# Patient Record
Sex: FEMALE | Race: WHITE | Employment: UNEMPLOYED | URBAN - NONMETROPOLITAN AREA
[De-identification: names, ages, dates, MRNs, and addresses within clinical notes are randomized per-mention and may not be internally consistent; named-entity substitution may affect disease eponyms.]

---

## 2019-06-06 LAB
IGA: 122
IGG: 545
IGM: 121

## 2019-07-10 ENCOUNTER — OFFICE VISIT (OUTPATIENT)
Dept: PRIMARY CARE CLINIC | Age: 54
End: 2019-07-10
Payer: COMMERCIAL

## 2019-07-10 VITALS
WEIGHT: 125 LBS | OXYGEN SATURATION: 98 % | SYSTOLIC BLOOD PRESSURE: 120 MMHG | HEART RATE: 88 BPM | HEIGHT: 65 IN | DIASTOLIC BLOOD PRESSURE: 70 MMHG | TEMPERATURE: 98 F | RESPIRATION RATE: 16 BRPM | BODY MASS INDEX: 20.83 KG/M2

## 2019-07-10 DIAGNOSIS — K21.9 GASTROESOPHAGEAL REFLUX DISEASE WITHOUT ESOPHAGITIS: ICD-10-CM

## 2019-07-10 DIAGNOSIS — R53.82 CHRONIC FATIGUE: ICD-10-CM

## 2019-07-10 DIAGNOSIS — D50.9 IRON DEFICIENCY ANEMIA, UNSPECIFIED IRON DEFICIENCY ANEMIA TYPE: Primary | ICD-10-CM

## 2019-07-10 DIAGNOSIS — D70.9 NEUTROPENIA, UNSPECIFIED TYPE (HCC): ICD-10-CM

## 2019-07-10 DIAGNOSIS — D84.9 IMMUNODEFICIENCY (HCC): ICD-10-CM

## 2019-07-10 PROBLEM — K25.9 MULTIPLE GASTRIC ULCERS: Status: ACTIVE | Noted: 2019-07-10

## 2019-07-10 PROBLEM — M17.10 KNEE ARTHROPATHY: Status: ACTIVE | Noted: 2019-07-10

## 2019-07-10 PROBLEM — N20.0 KIDNEY STONES: Status: ACTIVE | Noted: 2019-07-10

## 2019-07-10 PROBLEM — G43.009 MIGRAINE WITHOUT AURA AND WITHOUT STATUS MIGRAINOSUS, NOT INTRACTABLE: Status: ACTIVE | Noted: 2019-07-10

## 2019-07-10 PROBLEM — K83.1 BILE DUCT STENOSIS: Status: ACTIVE | Noted: 2019-07-10

## 2019-07-10 PROCEDURE — 99213 OFFICE O/P EST LOW 20 MIN: CPT | Performed by: INTERNAL MEDICINE

## 2019-07-10 SDOH — HEALTH STABILITY: MENTAL HEALTH: HOW OFTEN DO YOU HAVE A DRINK CONTAINING ALCOHOL?: NEVER

## 2019-07-10 ASSESSMENT — PATIENT HEALTH QUESTIONNAIRE - PHQ9
SUM OF ALL RESPONSES TO PHQ QUESTIONS 1-9: 0
2. FEELING DOWN, DEPRESSED OR HOPELESS: 0
1. LITTLE INTEREST OR PLEASURE IN DOING THINGS: 0
SUM OF ALL RESPONSES TO PHQ QUESTIONS 1-9: 0
SUM OF ALL RESPONSES TO PHQ9 QUESTIONS 1 & 2: 0

## 2019-07-10 ASSESSMENT — ENCOUNTER SYMPTOMS
EYE ITCHING: 0
EYE DISCHARGE: 0
TROUBLE SWALLOWING: 0
VOMITING: 0
EYE PAIN: 0
COLOR CHANGE: 0
SORE THROAT: 0
CONSTIPATION: 0
RHINORRHEA: 0
DIARRHEA: 0
ABDOMINAL PAIN: 1
BLOOD IN STOOL: 0
PHOTOPHOBIA: 0
FACIAL SWELLING: 0
COUGH: 1
ANAL BLEEDING: 0
NAUSEA: 0
WHEEZING: 0
SHORTNESS OF BREATH: 0
STRIDOR: 0

## 2019-07-10 NOTE — ASSESSMENT & PLAN NOTE
Request was made in past for immunoglobulin G but was denied by her insurance company.   We will see Dr. Davison  is willing to request this again

## 2019-07-10 NOTE — PROGRESS NOTES
7/10/2019    Name: Tylor Lopez : 1965 Sex: female  Age: 47 y.o. Subjective:  Chief Complaint   Patient presents with    GI Problem     3 month follow up      Patient has a history of iron deficiency anemia secondary to possible GI blood loss. Has not not had an EGD recently. Previous EGD showed multiple gastric ulcers and GERD. She also has bile duct stenosis and previously had a stent. Has not had an evaluation for  bile duct stenosis in about 4 years. She sees Dr. Sammy Dolan who has her on IV infusions with iron. She has brought her hemoglobin up to about 10 which is the highest has been in 15 years. She still extremely tired. She also has neutropenia which is being followed by hematology. Has a history of kidney stones, knee arthritis, migraines and immune deficiency. Review of Systems   Constitutional: Negative for appetite change, fatigue and unexpected weight change. HENT: Negative for congestion, ear pain, facial swelling, rhinorrhea, sore throat, tinnitus and trouble swallowing. Eyes: Negative for photophobia, pain, discharge, itching and visual disturbance. Respiratory: Positive for cough. Negative for shortness of breath, wheezing and stridor. Cardiovascular: Negative for chest pain, palpitations and leg swelling. Gastrointestinal: Positive for abdominal pain. Negative for anal bleeding, blood in stool, constipation, diarrhea, nausea and vomiting. Endocrine: Negative for cold intolerance, heat intolerance, polydipsia, polyphagia and polyuria. Genitourinary: Negative for difficulty urinating, dysuria, flank pain, frequency, hematuria and urgency. Musculoskeletal: Positive for arthralgias. Negative for gait problem, joint swelling and myalgias. Skin: Negative for color change, pallor and rash. Allergic/Immunologic: Negative for environmental allergies and food allergies.    Neurological: Negative for dizziness, tremors, seizures, syncope, speech difficulty,

## 2019-07-24 ENCOUNTER — OFFICE VISIT (OUTPATIENT)
Dept: PRIMARY CARE CLINIC | Age: 54
End: 2019-07-24
Payer: COMMERCIAL

## 2019-07-24 VITALS
DIASTOLIC BLOOD PRESSURE: 90 MMHG | HEIGHT: 65 IN | WEIGHT: 126.2 LBS | HEART RATE: 102 BPM | RESPIRATION RATE: 18 BRPM | SYSTOLIC BLOOD PRESSURE: 140 MMHG | BODY MASS INDEX: 21.02 KG/M2 | OXYGEN SATURATION: 98 % | TEMPERATURE: 98 F

## 2019-07-24 DIAGNOSIS — R05.9 COUGH: ICD-10-CM

## 2019-07-24 DIAGNOSIS — J40 BRONCHITIS: Primary | ICD-10-CM

## 2019-07-24 PROCEDURE — 99213 OFFICE O/P EST LOW 20 MIN: CPT | Performed by: INTERNAL MEDICINE

## 2019-07-24 RX ORDER — FERROUS SULFATE 7.5 MG/0.5
15 SYRINGE (EA) ORAL DAILY
COMMUNITY
End: 2022-07-20 | Stop reason: ALTCHOICE

## 2019-07-24 RX ORDER — BENZONATATE 100 MG/1
100 CAPSULE ORAL 2 TIMES DAILY PRN
Qty: 20 CAPSULE | Refills: 0 | Status: SHIPPED | OUTPATIENT
Start: 2019-07-24 | End: 2019-07-31

## 2019-07-24 RX ORDER — CEFDINIR 300 MG/1
300 CAPSULE ORAL 2 TIMES DAILY
Qty: 14 CAPSULE | Refills: 0 | Status: SHIPPED | OUTPATIENT
Start: 2019-07-24 | End: 2019-07-31

## 2019-07-24 ASSESSMENT — ENCOUNTER SYMPTOMS
WHEEZING: 1
SORE THROAT: 1
COUGH: 1
SHORTNESS OF BREATH: 1
CHEST TIGHTNESS: 0

## 2019-07-24 NOTE — PROGRESS NOTES
140/90   Site: Left Upper Arm   Position: Sitting   Cuff Size: Medium Adult   Pulse: 102   Resp: 18   Temp: 98 °F (36.7 °C)   TempSrc: Temporal   SpO2: 98%   Weight: 126 lb 3.2 oz (57.2 kg)   Height: 5' 5\" (1.651 m)       Physical Exam   Constitutional: No distress. HENT:   Head: Normocephalic and atraumatic. Right Ear: External ear normal.   Left Ear: External ear normal.   Mouth/Throat: Oropharynx is clear and moist. No oropharyngeal exudate. Eyes: EOM are normal.   Neck: Neck supple. Cardiovascular: Normal rate, regular rhythm and normal heart sounds. Pulmonary/Chest: Effort normal. She has no wheezes. Bilateral ronchi   Lymphadenopathy:     She has no cervical adenopathy.         Problem List        Respiratory    Bronchitis - Primary     cxr  Cefdinir 300 mg bid 7 days         Relevant Medications    cefdinir (OMNICEF) 300 MG capsule    Other Relevant Orders    XR CHEST STANDARD (2 VW)       Other    Cough     Benzonatate prn cough         Relevant Medications    benzonatate (TESSALON) 100 MG capsule    Other Relevant Orders    XR CHEST STANDARD (2 VW)      See me on Monday, 7/29/2019 if no better    Seen by:   Ludwig Wolff,

## 2019-07-29 DIAGNOSIS — R05.9 COUGH: ICD-10-CM

## 2019-07-29 DIAGNOSIS — J40 BRONCHITIS: ICD-10-CM

## 2019-08-06 DIAGNOSIS — J40 BRONCHITIS: Primary | ICD-10-CM

## 2019-08-06 DIAGNOSIS — R05.9 COUGH: ICD-10-CM

## 2019-08-06 RX ORDER — GUAIFENESIN AND CODEINE PHOSPHATE 100; 10 MG/5ML; MG/5ML
5 SOLUTION ORAL 2 TIMES DAILY PRN
Qty: 30 ML | Refills: 1 | Status: SHIPPED | OUTPATIENT
Start: 2019-08-06 | End: 2019-08-09

## 2019-08-06 RX ORDER — CEFDINIR 300 MG/1
300 CAPSULE ORAL 2 TIMES DAILY
Qty: 14 CAPSULE | Refills: 0 | Status: SHIPPED | OUTPATIENT
Start: 2019-08-06 | End: 2019-08-13

## 2019-08-23 PROBLEM — R05.9 COUGH: Status: RESOLVED | Noted: 2019-07-24 | Resolved: 2019-08-23

## 2019-10-09 ENCOUNTER — OFFICE VISIT (OUTPATIENT)
Dept: PRIMARY CARE CLINIC | Age: 54
End: 2019-10-09
Payer: COMMERCIAL

## 2019-10-09 VITALS
BODY MASS INDEX: 20.83 KG/M2 | TEMPERATURE: 98.6 F | RESPIRATION RATE: 16 BRPM | HEART RATE: 86 BPM | OXYGEN SATURATION: 99 % | WEIGHT: 125 LBS | HEIGHT: 65 IN | SYSTOLIC BLOOD PRESSURE: 128 MMHG | DIASTOLIC BLOOD PRESSURE: 78 MMHG

## 2019-10-09 DIAGNOSIS — D50.0 IRON DEFICIENCY ANEMIA DUE TO CHRONIC BLOOD LOSS: ICD-10-CM

## 2019-10-09 DIAGNOSIS — K21.9 GASTROESOPHAGEAL REFLUX DISEASE WITHOUT ESOPHAGITIS: ICD-10-CM

## 2019-10-09 DIAGNOSIS — R53.82 CHRONIC FATIGUE: ICD-10-CM

## 2019-10-09 DIAGNOSIS — D70.9 NEUTROPENIA, UNSPECIFIED TYPE (HCC): ICD-10-CM

## 2019-10-09 DIAGNOSIS — M17.10 KNEE ARTHROPATHY: Primary | ICD-10-CM

## 2019-10-09 PROBLEM — R05.9 COUGH: Status: ACTIVE | Noted: 2017-02-02

## 2019-10-09 PROBLEM — N20.1 OCCLUSION OF URETER DUE TO CALCULUS: Status: ACTIVE | Noted: 2017-05-23

## 2019-10-09 PROCEDURE — 3014F SCREEN MAMMO DOC REV: CPT | Performed by: INTERNAL MEDICINE

## 2019-10-09 PROCEDURE — 99213 OFFICE O/P EST LOW 20 MIN: CPT | Performed by: INTERNAL MEDICINE

## 2019-10-09 PROCEDURE — G8420 CALC BMI NORM PARAMETERS: HCPCS | Performed by: INTERNAL MEDICINE

## 2019-10-09 PROCEDURE — 3017F COLORECTAL CA SCREEN DOC REV: CPT | Performed by: INTERNAL MEDICINE

## 2019-10-09 PROCEDURE — 1036F TOBACCO NON-USER: CPT | Performed by: INTERNAL MEDICINE

## 2019-10-09 PROCEDURE — G8427 DOCREV CUR MEDS BY ELIG CLIN: HCPCS | Performed by: INTERNAL MEDICINE

## 2019-10-09 PROCEDURE — G8484 FLU IMMUNIZE NO ADMIN: HCPCS | Performed by: INTERNAL MEDICINE

## 2019-10-09 RX ORDER — CALCIUM CARBONATE 200(500)MG
1 TABLET,CHEWABLE ORAL 3 TIMES DAILY PRN
COMMUNITY
End: 2020-04-06

## 2019-10-09 ASSESSMENT — ENCOUNTER SYMPTOMS
WHEEZING: 0
COUGH: 1
DIARRHEA: 0
EYE PAIN: 0
SORE THROAT: 0
SHORTNESS OF BREATH: 0
COLOR CHANGE: 0
PHOTOPHOBIA: 0
BLOOD IN STOOL: 0
NAUSEA: 0
TROUBLE SWALLOWING: 0
EYE ITCHING: 0
VOMITING: 0
ANAL BLEEDING: 0
FACIAL SWELLING: 0
CHEST TIGHTNESS: 0
CONSTIPATION: 0
RHINORRHEA: 0
STRIDOR: 0
EYE DISCHARGE: 0
ABDOMINAL PAIN: 0

## 2019-11-04 ENCOUNTER — TELEPHONE (OUTPATIENT)
Dept: PRIMARY CARE CLINIC | Age: 54
End: 2019-11-04

## 2019-11-08 PROBLEM — R05.9 COUGH: Status: RESOLVED | Noted: 2017-02-02 | Resolved: 2019-11-08

## 2019-12-12 DIAGNOSIS — Z12.31 ENCOUNTER FOR SCREENING MAMMOGRAM FOR MALIGNANT NEOPLASM OF BREAST: Primary | ICD-10-CM

## 2020-01-29 ENCOUNTER — OFFICE VISIT (OUTPATIENT)
Dept: PRIMARY CARE CLINIC | Age: 55
End: 2020-01-29
Payer: COMMERCIAL

## 2020-01-29 VITALS
TEMPERATURE: 98 F | WEIGHT: 124 LBS | BODY MASS INDEX: 20.66 KG/M2 | SYSTOLIC BLOOD PRESSURE: 138 MMHG | HEIGHT: 65 IN | DIASTOLIC BLOOD PRESSURE: 78 MMHG | HEART RATE: 99 BPM | OXYGEN SATURATION: 99 %

## 2020-01-29 PROCEDURE — 1036F TOBACCO NON-USER: CPT | Performed by: INTERNAL MEDICINE

## 2020-01-29 PROCEDURE — 3017F COLORECTAL CA SCREEN DOC REV: CPT | Performed by: INTERNAL MEDICINE

## 2020-01-29 PROCEDURE — G8427 DOCREV CUR MEDS BY ELIG CLIN: HCPCS | Performed by: INTERNAL MEDICINE

## 2020-01-29 PROCEDURE — G9899 SCRN MAM PERF RSLTS DOC: HCPCS | Performed by: INTERNAL MEDICINE

## 2020-01-29 PROCEDURE — G8484 FLU IMMUNIZE NO ADMIN: HCPCS | Performed by: INTERNAL MEDICINE

## 2020-01-29 PROCEDURE — G8420 CALC BMI NORM PARAMETERS: HCPCS | Performed by: INTERNAL MEDICINE

## 2020-01-29 PROCEDURE — 99213 OFFICE O/P EST LOW 20 MIN: CPT | Performed by: INTERNAL MEDICINE

## 2020-01-29 RX ORDER — ESOMEPRAZOLE MAGNESIUM 20 MG
20 CAPSULE,DELAYED RELEASE (ENTERIC COATED) ORAL
Qty: 30 CAPSULE | Refills: 3 | Status: SHIPPED
Start: 2020-01-29 | End: 2020-04-06

## 2020-01-29 ASSESSMENT — ENCOUNTER SYMPTOMS
DIARRHEA: 0
CHEST TIGHTNESS: 0
ABDOMINAL PAIN: 1
ANAL BLEEDING: 0
BLOOD IN STOOL: 0
CONSTIPATION: 0
COLOR CHANGE: 0
EYE ITCHING: 0
SORE THROAT: 0
VOMITING: 0
NAUSEA: 0
EYE DISCHARGE: 0
EYE PAIN: 0
SHORTNESS OF BREATH: 0
STRIDOR: 0
PHOTOPHOBIA: 0
RHINORRHEA: 0
WHEEZING: 0
COUGH: 0
TROUBLE SWALLOWING: 0
FACIAL SWELLING: 0

## 2020-01-29 ASSESSMENT — PATIENT HEALTH QUESTIONNAIRE - PHQ9
2. FEELING DOWN, DEPRESSED OR HOPELESS: 0
SUM OF ALL RESPONSES TO PHQ QUESTIONS 1-9: 0
SUM OF ALL RESPONSES TO PHQ QUESTIONS 1-9: 0
SUM OF ALL RESPONSES TO PHQ9 QUESTIONS 1 & 2: 0
1. LITTLE INTEREST OR PLEASURE IN DOING THINGS: 0

## 2020-01-29 NOTE — ASSESSMENT & PLAN NOTE
Continue her Nexium.   Hopefully she will get well enough that she can undergo an EGD with Dr. Nathan Gamble

## 2020-01-30 ENCOUNTER — TELEPHONE (OUTPATIENT)
Dept: PRIMARY CARE CLINIC | Age: 55
End: 2020-01-30

## 2020-01-30 RX ORDER — PANTOPRAZOLE SODIUM 40 MG/1
40 TABLET, DELAYED RELEASE ORAL
Qty: 30 TABLET | Refills: 5 | Status: SHIPPED
Start: 2020-01-30 | End: 2021-10-18 | Stop reason: SDUPTHER

## 2020-04-03 ENCOUNTER — TELEPHONE (OUTPATIENT)
Dept: PRIMARY CARE CLINIC | Age: 55
End: 2020-04-03

## 2020-04-03 RX ORDER — CEFDINIR 300 MG/1
300 CAPSULE ORAL 2 TIMES DAILY
Qty: 20 CAPSULE | Refills: 0 | Status: SHIPPED
Start: 2020-04-03 | End: 2020-04-13 | Stop reason: ALTCHOICE

## 2020-04-06 ENCOUNTER — VIRTUAL VISIT (OUTPATIENT)
Dept: PRIMARY CARE CLINIC | Age: 55
End: 2020-04-06
Payer: COMMERCIAL

## 2020-04-06 PROBLEM — R77.1 HYPOGLOBULINEMIA: Status: ACTIVE | Noted: 2020-04-06

## 2020-04-06 PROBLEM — J01.40 SUBACUTE PANSINUSITIS: Status: ACTIVE | Noted: 2020-04-06

## 2020-04-06 PROCEDURE — 99442 PR PHYS/QHP TELEPHONE EVALUATION 11-20 MIN: CPT | Performed by: INTERNAL MEDICINE

## 2020-04-06 ASSESSMENT — ENCOUNTER SYMPTOMS
VOMITING: 0
DIARRHEA: 0
ANAL BLEEDING: 0
SINUS PAIN: 1
SORE THROAT: 0
EYE ITCHING: 0
WHEEZING: 0
RHINORRHEA: 0
COLOR CHANGE: 0
NAUSEA: 1
SINUS PRESSURE: 1
CHEST TIGHTNESS: 0
TROUBLE SWALLOWING: 0
PHOTOPHOBIA: 0
EYE PAIN: 0
BLOOD IN STOOL: 0
EYE DISCHARGE: 0
COUGH: 1
FACIAL SWELLING: 0
ABDOMINAL PAIN: 1
CONSTIPATION: 0
STRIDOR: 0

## 2020-04-06 NOTE — PROGRESS NOTES
List        Respiratory    Bronchitis - Primary     Continue Ceftdinir. I am reluctant to send her for imaging studies at this time in view of the Covid 19 situation and her immunodeficient state. Relevant Medications    cefdinir (OMNICEF) 300 MG capsule    Acute pansinusitis     Continue antihistamines and antibiotics. Relevant Medications    cefdinir (OMNICEF) 300 MG capsule       Digestive    Gastroesophageal reflux disease without esophagitis     Continue pantoprazole and see Dr. Hilario Layne in the future         Relevant Medications    pantoprazole (PROTONIX) 40 MG tablet    Bile duct stenosis     Continue IV Zofran as needed for nausea         Multiple gastric ulcers     Unable to get EGD because of Covid 19 situation         Relevant Medications    pantoprazole (PROTONIX) 40 MG tablet       Musculoskeletal and Integument    Knee arthropathy     Patient uses a brace. Other    Chronic fatigue     Secondary to her multiple comorbidities. Immunodeficiency [facility])     She needs IV immunoglobulin however since her insurance refuses to pay for this.          Neutropenia ([facility] [address]     Continue monitoring CBC as per Dr. Afshin Acuña as needed for cough         Iron deficiency anemia due to chronic blood loss     Continue IV iron weekly         Hypoglobulinemia     Continue to monitor as per her hematologist           follow up in one week    Seen by:   Lillian Ness DO

## 2020-04-13 ENCOUNTER — TELEPHONE (OUTPATIENT)
Dept: PRIMARY CARE CLINIC | Age: 55
End: 2020-04-13

## 2020-04-13 ENCOUNTER — VIRTUAL VISIT (OUTPATIENT)
Dept: PRIMARY CARE CLINIC | Age: 55
End: 2020-04-13
Payer: COMMERCIAL

## 2020-04-13 PROBLEM — E87.6 HYPOKALEMIA: Status: ACTIVE | Noted: 2020-04-13

## 2020-04-13 PROBLEM — N20.0 KIDNEY STONES: Status: RESOLVED | Noted: 2019-07-10 | Resolved: 2020-04-13

## 2020-04-13 PROBLEM — B02.9 HERPES ZOSTER WITHOUT COMPLICATION: Status: ACTIVE | Noted: 2020-04-13

## 2020-04-13 PROBLEM — N20.1 OCCLUSION OF URETER DUE TO CALCULUS: Status: RESOLVED | Noted: 2017-05-23 | Resolved: 2020-04-13

## 2020-04-13 PROCEDURE — 99214 OFFICE O/P EST MOD 30 MIN: CPT | Performed by: INTERNAL MEDICINE

## 2020-04-13 RX ORDER — LORATADINE 10 MG/1
10 CAPSULE, LIQUID FILLED ORAL DAILY
COMMUNITY
End: 2021-10-18

## 2020-04-13 RX ORDER — DOXYCYCLINE HYCLATE 100 MG
100 TABLET ORAL 2 TIMES DAILY
Qty: 14 TABLET | Refills: 0 | Status: SHIPPED | OUTPATIENT
Start: 2020-04-13 | End: 2020-04-20

## 2020-04-13 RX ORDER — SUCRALFATE 1 G/1
1 TABLET ORAL 4 TIMES DAILY
Qty: 60 TABLET | Refills: 0 | Status: SHIPPED
Start: 2020-04-13 | End: 2020-05-26

## 2020-04-13 RX ORDER — ACYCLOVIR 400 MG/1
400 TABLET ORAL
Qty: 45 TABLET | Refills: 0 | Status: SHIPPED
Start: 2020-04-13 | End: 2020-04-22 | Stop reason: ALTCHOICE

## 2020-04-13 ASSESSMENT — ENCOUNTER SYMPTOMS
NAUSEA: 1
CONSTIPATION: 0
TROUBLE SWALLOWING: 0
ANAL BLEEDING: 0
DIARRHEA: 0
BLOOD IN STOOL: 0
ABDOMINAL PAIN: 1
WHEEZING: 0
FACIAL SWELLING: 0
PHOTOPHOBIA: 0
COUGH: 1
RHINORRHEA: 0
EYE PAIN: 0
SINUS PRESSURE: 1
SORE THROAT: 0
SINUS PAIN: 1
EYE ITCHING: 0
STRIDOR: 0
COLOR CHANGE: 0
VOMITING: 1
CHEST TIGHTNESS: 0
EYE DISCHARGE: 0

## 2020-04-13 NOTE — PROGRESS NOTES
2020    Name: Ramesh Sandhu     :1965      Sex:female       Age : 54 y.o. Chief complaint: fatigue and sinusitis . rash    HPI     Ramesh Sandhu is a 54 y.o. female being evaluated by a Virtual Visit (video visit) encounter to address concerns as mentioned above. A caregiver was present when appropriate. Due to this being a TeleHealth encounter (During [facility] public health emergency), evaluation of the following organ systems was limited: Vitals/Constitutional/EENT/Resp/CV/GI//MS/Neuro/Skin/Heme-Lymph-Imm. Pursuant to the emergency declaration under the [address] [place] [address] and the [employer] Act, this Virtual Visit was conducted with patient's (and/or legal guardian's) consent, to reduce the patient's risk of exposure to COVID-19 and provide necessary medical care. The patient (and/or legal guardian) has also been advised to contact this office for worsening conditions or problems, and seek emergency medical treatment and/or call 911 if deemed necessary. Services were provided through a video synchronous discussion virtually to substitute for in-person clinic visit. Patient and provider were located at their individual homes. --Sydney Evangelista DO on 2020 at 10:23 AM    An electronic signature was used to authenticate this note. Patient developed a rash 2 days ago on her right flank. Very similar to shingles that she has had in the past.  She complains of severe pain and itching at the site of the rash. She had some leftover acyclovir that she took yesterday. She is well need a refill. Patient  had recurrent upper respiratory infections. She had them every month since 2019. Most of the time she would just take  over-the-counter medications and symptomatic care. Since mid March she has had a persistent respiratory infection.   She has sinus pressure low at 4 and iron was only 19. Her iron saturation was low at 5%. .. This did not improve despite her IV iron and Venofer infusions. Told her it may take several infusions before she sees any improvement in her numbers. She had severe reactions to both. She had swelling of her right knee. She had increased myalgias, arthralgias, fatigue. .  Dr. Lucy Quevedo discontinued the IV Venofer  And kept her on IV Ferrlecit every week. Reviewed  Dr. Sarai Marques note from November 6, 2019. Patient had 1 dose of IVIG but could not continue this because of her insurance    CMP reviewed and her potassium was 3.5. Pedro Luis Bustamante She has a history of dehydration and has a  port. She gives herself normal saline IV every night    . She has a lot of problems with abdominal pain, nausea and vomiting. She gives herself IV Zofran. She still has not seen Dr. Michelle Torres for an EGD. Pedro Luis Bustamante She is never well enough to undergo it because of recurrent upper respiratory infections. She has a history of gastric ulcers and she takes pantoprazole. We will add Carafate and see if this helps. We discussed previously possibility of her getting disability because of her medical conditions. I strongly encouraged her to pursue this route. Medical leave of absence form will be filled out and sent to her employer before 17 April 2020          Review of Systems   Constitutional: Positive for fatigue and fever. Negative for appetite change and unexpected weight change. HENT: Positive for congestion, ear pain, sinus pressure and sinus pain. Negative for ear discharge, facial swelling, rhinorrhea, sore throat, tinnitus and trouble swallowing. Eyes: Negative for photophobia, pain, discharge, itching and visual disturbance. Respiratory: Positive for cough. Negative for chest tightness, wheezing and stridor. Cardiovascular: Negative for chest pain, palpitations and leg swelling. Gastrointestinal: Positive for abdominal pain, nausea and vomiting.  Negative

## 2020-04-13 NOTE — ASSESSMENT & PLAN NOTE
Treat infections as they occur. They have been getting more frequent and with each recurrent infection she has more problems fighting them off.

## 2020-04-13 NOTE — TELEPHONE ENCOUNTER
Pt informed and verbalized understanding. Pt states next lab work with Dr David Otero is tomorrow, order faxed.

## 2020-04-13 NOTE — ASSESSMENT & PLAN NOTE
Acyclovir 400 mg 5 times a day finish up a 10-day course. Calamine lotion to rash. She will be unable to go back to work because of her herpes zoster. Will ask for 4-week medical leave of absence for this.

## 2020-04-13 NOTE — ASSESSMENT & PLAN NOTE
Increase potassium containing foods and will recheck a CMP with her next blood work at the hospital.

## 2020-04-15 ENCOUNTER — TELEPHONE (OUTPATIENT)
Dept: PRIMARY CARE CLINIC | Age: 55
End: 2020-04-15

## 2020-04-15 NOTE — TELEPHONE ENCOUNTER
Please call [facility] and get the results of her CMP that was drawn 2 days ago.   Thank you

## 2020-04-16 ENCOUNTER — TELEPHONE (OUTPATIENT)
Dept: PRIMARY CARE CLINIC | Age: 55
End: 2020-04-16

## 2020-04-16 NOTE — TELEPHONE ENCOUNTER
I still have not received the CMP that was ordered on 4/14/2020. Please call [facility] lab and see if it was actually done on that date.

## 2020-04-20 LAB
A/G RATIO: 1.9 RATIO (ref 1.1–2.2)
ALBUMIN SERPL-MCNC: 4.3 G/DL (ref 3.4–4.8)
ALP BLD-CCNC: 53 U/L (ref 42–121)
ALT SERPL-CCNC: 12 U/L (ref 10–54)
ANION GAP SERPL CALCULATED.3IONS-SCNC: 7 MEQ/L (ref 3–11)
AST SERPL-CCNC: 19 U/L (ref 10–41)
BILIRUB SERPL-MCNC: 0.4 MG/DL (ref 0.3–1.5)
BUN BLDV-MCNC: 15 MG/DL (ref 8–21)
CALCIUM SERPL-MCNC: 8.8 MG/DL (ref 8.5–10.5)
CHLORIDE BLD-SCNC: 111 MEQ/L (ref 98–107)
CO2: 23 MEQ/L (ref 21–31)
CREAT SERPL-MCNC: 0.7 MG/DL (ref 0.4–1)
CREATININE + EGFR PANEL: 105 ML/MIN
GFR NON-AFRICAN AMERICAN: 87 ML/MIN
GLOBULIN: 2.3 G/DL (ref 1.9–3.9)
GLUCOSE BLD-MCNC: 106 MG/DL (ref 70–99)
POTASSIUM SERPL-SCNC: 4.1 MEQ/L (ref 3.6–5)
SODIUM BLD-SCNC: 141 MEQ/L (ref 135–145)
TOTAL PROTEIN: 6.6 G/DL (ref 5.9–7.8)

## 2020-04-22 ENCOUNTER — VIRTUAL VISIT (OUTPATIENT)
Dept: PRIMARY CARE CLINIC | Age: 55
End: 2020-04-22
Payer: COMMERCIAL

## 2020-04-22 PROCEDURE — 99213 OFFICE O/P EST LOW 20 MIN: CPT | Performed by: INTERNAL MEDICINE

## 2020-04-22 ASSESSMENT — ENCOUNTER SYMPTOMS
ABDOMINAL PAIN: 1
SORE THROAT: 0
COLOR CHANGE: 0
SHORTNESS OF BREATH: 0
CONSTIPATION: 0
PHOTOPHOBIA: 0
EYE PAIN: 0
VOMITING: 1
COUGH: 1
DIARRHEA: 0
FACIAL SWELLING: 0
EYE ITCHING: 0
RHINORRHEA: 0
BLOOD IN STOOL: 0
TROUBLE SWALLOWING: 0
WHEEZING: 0
ANAL BLEEDING: 0
STRIDOR: 0
EYE DISCHARGE: 0
NAUSEA: 1

## 2020-04-22 NOTE — PROGRESS NOTES
Tobacco Use    Smoking status: Never Smoker    Smokeless tobacco: Never Used   Substance Use Topics    Alcohol use: Never     Frequency: Never    Drug use: Never            PHYSICAL EXAMINATION:  [ INSTRUCTIONS:  \"[x]\" Indicates a positive item  \"[]\" Indicates a negative item  -- DELETE ALL ITEMS NOT EXAMINED]  Vital Signs: (As obtained by patient/caregiver or practitioner observation)    Blood pressure-126/72 heart rate-96   respiratory rate-18   temperature-  Pulse oximetry-     Constitutional: [] Appears well-developed and well-nourished [x] No apparent distress      [x] Abnormal-thin and appears ill     mental status  [x] Alert and awake  [x] Oriented to person/place/time [x]Able to follow commands      Eyes:  EOM    [x]  Normal  [] Abnormal-  Sclera  [x]  Normal  [] Abnormal -         Discharge [x]  None visible  [] Abnormal -    HENT:   [x] Normocephalic, atraumatic. [] Abnormal   [] Mouth/Throat: Mucous membranes are moist.     External Ears [x] Normal  [] Abnormal-     Neck: [x] No visualized mass     Pulmonary/Chest: [x] Respiratory effort normal.  [x] No visualized signs of difficulty breathing or respiratory distress        [x] Abnormal-cough     musculoskeletal:   [] Normal gait with no signs of ataxia         [] Normal range of motion of neck        [x] Abnormal-   Swelling of the right hand and left knee    Neurological:        [x] No Facial Asymmetry (Cranial nerve 7 motor function) (limited exam to video visit)          [x] No gaze palsy        [] Abnormal-         Skin:        [] No significant exanthematous lesions or discoloration noted on facial skin         [x] Abnormal-           Rash on right back extending to scapular area    Psychiatric:       [x] Normal Affect [x] No Hallucinations        [] Abnormal-     Other pertinent observable physical exam findings-     ASSESSMENT/PLAN:  1. Chronic fatigue  This is getting much worse with the decrease in her white blood cell count.   Her hemoglobin is also worse than it was before. Will await Dr. Prashanth Connor  recommendation    2. Herpes zoster without complication  Slowly healing but still painful. Finished acyclovir. 3. Bile duct stenosis  She needs to follow-up with her GI doctor. She will probably need repeat ERCP. She had a biliary stent which has been removed. 4. Abdominal pain, epigastric  Multifactorial in etiology    5. Multiple gastric ulcers  Continue pantoprazole and Carafate. Make an appointment with Dr. Scar Suarez as she needs an EGD    6. Knee arthropathy  Continue using her brace    7. Other neutropenia ([facility] [address]  Follow-up with Dr. David Otero . If her white blood cell count decreases even more she may need treatment for this    8. Iron deficiency anemia due to chronic blood loss  Continue with IV iron as per hematology    9. Immunodeficiency ([facility] [address]  Dr. David Otero will monitor    10. Hypoglobulinemia  Monitor. Would like to get IV immunoglobulin however insurance will not pay for it    11. Cough  If this does not improve may be forced to try Advair to see if that will help her cough      Return in about 13 days (around 5/5/2020). Ga Brandt is a 54 y.o. female being evaluated by a Virtual Visit (video visit) encounter to address concerns as mentioned above. A caregiver was present when appropriate. Due to this being a TeleHealth encounter (During [place] [address] public health emergency), evaluation of the following organ systems was limited: Vitals/Constitutional/EENT/Resp/CV/GI//MS/Neuro/Skin/Heme-Lymph-Imm. Pursuant to the emergency declaration under the [address] [place] [address], [address] [place] [address] authority and the [employer] Act, this Virtual Visit was conducted with patient's (and/or legal guardian's) consent, to reduce the patient's risk of exposure to COVID-19 and provide necessary medical care.   The patient (and/or legal guardian) has also been advised to contact this office for worsening conditions or problems, and seek emergency medical treatment and/or call 911 if deemed necessary. Services were provided through a video synchronous discussion virtually to substitute for in-person clinic visit. Patient and provider were located at their individual homes. --Sydney Evangelista DO on 4/22/2020 at 2:54 PM    An electronic signature was used to authenticate this note.

## 2020-04-27 ENCOUNTER — VIRTUAL VISIT (OUTPATIENT)
Dept: PRIMARY CARE CLINIC | Age: 55
End: 2020-04-27
Payer: COMMERCIAL

## 2020-04-27 PROBLEM — H10.33 ACUTE BACTERIAL CONJUNCTIVITIS OF BOTH EYES: Status: ACTIVE | Noted: 2020-04-27

## 2020-04-27 PROBLEM — H66.002 NON-RECURRENT ACUTE SUPPURATIVE OTITIS MEDIA OF LEFT EAR WITHOUT SPONTANEOUS RUPTURE OF TYMPANIC MEMBRANE: Status: ACTIVE | Noted: 2020-04-27

## 2020-04-27 PROCEDURE — 99213 OFFICE O/P EST LOW 20 MIN: CPT | Performed by: INTERNAL MEDICINE

## 2020-04-27 RX ORDER — CEFDINIR 300 MG/1
300 CAPSULE ORAL 2 TIMES DAILY
Qty: 20 CAPSULE | Refills: 0 | Status: SHIPPED | OUTPATIENT
Start: 2020-04-27 | End: 2020-05-07

## 2020-04-27 RX ORDER — PROCHLORPERAZINE MALEATE 10 MG
TABLET ORAL
COMMUNITY
Start: 2019-09-12 | End: 2021-10-18

## 2020-04-27 RX ORDER — ONDANSETRON 4 MG/1
TABLET, FILM COATED ORAL
COMMUNITY
Start: 2017-06-07 | End: 2022-07-20 | Stop reason: ALTCHOICE

## 2020-04-27 RX ORDER — FAMOTIDINE 10 MG
TABLET ORAL
COMMUNITY
Start: 2017-06-07 | End: 2022-07-20 | Stop reason: DRUGHIGH

## 2020-04-27 ASSESSMENT — ENCOUNTER SYMPTOMS
VOMITING: 0
TROUBLE SWALLOWING: 1
DIARRHEA: 0
PHOTOPHOBIA: 0
COUGH: 1
EYE DISCHARGE: 0
RHINORRHEA: 0
ANAL BLEEDING: 0
NAUSEA: 0
FACIAL SWELLING: 0
EYE ITCHING: 0
ABDOMINAL PAIN: 0
SHORTNESS OF BREATH: 0
EYE PAIN: 0
BLOOD IN STOOL: 0
CONSTIPATION: 0
COLOR CHANGE: 0
STRIDOR: 0
SORE THROAT: 1
WHEEZING: 0

## 2020-04-27 NOTE — PROGRESS NOTES
Positive for weakness and headaches. Negative for dizziness, tremors, seizures, syncope, speech difficulty, light-headedness and numbness. Hematological: Negative for adenopathy. Does not bruise/bleed easily. Psychiatric/Behavioral: Negative for agitation, behavioral problems, confusion, sleep disturbance and suicidal ideas. The patient is not nervous/anxious. Prior to Visit Medications    Medication Sig Taking?  Authorizing Provider   ciprofloxacin (CIPRO) 200 MG/20ML injection Ciprofloxacin Ciprofloxacin HCL Active 1 DRP Eye-Both Every 2 hours as needed March 30th, 2020 8:13am 03-  [facility] ([id number]) Yes Historical Provider, MD   ondansetron [facility]) 4 MG tablet Ondansetron Ondansetron Hcl Active 4 MG Oral Every 6 hours as needed June 7th, 2017 2:47pm 06-  [facility] ([id number]) Yes Historical Provider, MD   albuterol-ipratropium (COMBIVENT RESPIMAT)  MCG/ACT AERS inhaler Albuterol / Ipratropium Albuterol/Ipratropium Active 1 PUFF Inhalation Four Times Daily June 7th, 2017 2:48pm 06-  [facility] ([id number]) Yes Historical Provider, MD   famotidine (PEPCID) 10 MG tablet Famotidine Famotidine Active 10 MG Oral Daily June 7th, 2017 2:46pm 06-  [facility] ([id number]) Yes Historical Provider, MD   prochlorperazine (COMPAZINE) 10 MG tablet Prochlorperazine Prochlorperazine Maleate Active 10 MG Oral Every 6 Hours 90 September 12th, 2019 11:29am 09-  [facility] ([id number]) Yes Historical Provider, MD   loratadine (CLARITIN) 10 MG capsule Take 10 mg by mouth daily Yes Historical Provider, MD   sucralfate (CARAFATE) 1 GM tablet Take 1 tablet by mouth 4 times daily for 15 days Yes Anais Mcgraw, DO   pantoprazole (PROTONIX) 40 MG tablet Take 1 tablet by mouth every morning (before breakfast) Yes Anais Mcgraw, DO   MULTIPLE VITAMIN IV Infuse intravenously Yes Historical Provider, MD   ferrous sulfate (ALEXANDRA-IN-SOL) 75 (15 Fe) MG/ML solution Take 15 mg by mouth daily Indications: Iron Infusions, weekly (solu-medrol before infusions.)  Yes Historical Provider, MD       Social History     Tobacco Use    Smoking status: Never Smoker    Smokeless tobacco: Never Used   Substance Use Topics    Alcohol use: Never     Frequency: Never    Drug use: Never            PHYSICAL EXAMINATION:  [ INSTRUCTIONS:  \"[x]\" Indicates a positive item  \"[]\" Indicates a negative item  -- DELETE ALL ITEMS NOT EXAMINED]  Vital Signs: (As obtained by patient/caregiver or practitioner observation)    Blood pressure-  Heart rate-    Respiratory rate-    Temperature-99.9   Pulse oximetry-     Constitutional:      [x] Abnormal-   Mental status  [x] Alert and awake  [] Oriented to person/place/time []Able to follow commands      Eyes:  EOM    [x]  Normal  [] Abnormal-  Sclera  [x]  Normal  [] Abnormal -         Discharge []  None visible  [x] Abnormal -    HENT:   [x] Normocephalic, atraumatic. [] Abnormal       External Ears [x] Normal  [] Abnormal-     Neck: [x] No visualized mass     Pulmonary/Chest: [x] Respiratory effort normal.  [x] No visualized signs of difficulty breathing or respiratory distress        [] Abnormal-            Neurological:        [x] No Facial Asymmetry (Cranial nerve 7 motor function) (limited exam to video visit)          [x] No gaze palsy        [] Abnormal-         Skin:                 [x] Abnormal-            Psychiatric:       [x] Normal Affect [x] No Hallucinations        [] Abnormal-     Other pertinent observable physical exam findings-     Patient appears ill    Both upper eyelids are slightly swollen and erythematous. No discharge. Dorsum of her right hand is erythematous and slightly swollen from IV iron this morning      ASSESSMENT/PLAN:  1. Non-recurrent acute suppurative otitis media of left ear without spontaneous rupture of tympanic membrane  Cefdinir 3 mg twice daily for 10 days.   If still having problems after this I strongly recommended referral to an ENT specialist.    2. Acute bacterial conjunctivitis of both eyes  Continue her eyedrops as directed. This is her second time for the conjunctivitis. I would strongly recommend referral to ophthalmologist for further evaluation if this happens again after finishing her eyedrops. Return in about 2 weeks (around 5/11/2020), or if symptoms worsen or fail to improve. TeleMedicine Patient Consent    This visit was performed as a virtual video visit using a synchronous, two-way, audio-video telehealth technology platform. Patient identification was verified at the start of the visit, including the patient's telephone number and physical location. I discussed with the patient the nature of our telehealth visits, that:     1. Due to the nature of an audio- video modality, the only components of a physical exam that could be done are the elements supported by direct observation. 2. I would evaluate the patient and recommend diagnostics and treatments based on my assessment. 3. If it was felt that the patient should be evaluated in clinic or an emergency room setting, then they would be directed there. 4. Our sessions are not being recorded and that personal health information is protected. 5. Our team would provide follow up care in person if/when the patient needs it. Patient does agree to proceed with telemedicine consultation. Patient's location: home address in [facility]  Physician  location other address in [facility] other people involved in call none        Time spent: Greater than Not billed by time    This visit was completed virtually using [employer]. me    --Destinee Mix DO on 4/27/2020 at 3:15 PM    An electronic signature was used to authenticate this note.

## 2020-05-05 ENCOUNTER — VIRTUAL VISIT (OUTPATIENT)
Dept: PRIMARY CARE CLINIC | Age: 55
End: 2020-05-05
Payer: COMMERCIAL

## 2020-05-05 PROCEDURE — 99213 OFFICE O/P EST LOW 20 MIN: CPT | Performed by: INTERNAL MEDICINE

## 2020-05-05 ASSESSMENT — ENCOUNTER SYMPTOMS
TROUBLE SWALLOWING: 0
PHOTOPHOBIA: 0
COUGH: 1
BLOOD IN STOOL: 0
DIARRHEA: 0
FACIAL SWELLING: 0
EYE PAIN: 0
WHEEZING: 0
RHINORRHEA: 0
COLOR CHANGE: 0
EYE DISCHARGE: 0
STRIDOR: 0
NAUSEA: 1
VOMITING: 0
EYE ITCHING: 0
ABDOMINAL PAIN: 0
SHORTNESS OF BREATH: 1
ANAL BLEEDING: 0
CONSTIPATION: 0
SORE THROAT: 0

## 2020-05-05 NOTE — PROGRESS NOTES
2017 2:48pm 06-  [facility] ([id number]) Yes Historical Provider, MD   famotidine (PEPCID) 10 MG tablet Famotidine Famotidine Active 10 MG Oral Daily June 7th, 2017 2:46pm 06-  [facility] ([id number]) Yes Historical Provider, MD   prochlorperazine (COMPAZINE) 10 MG tablet Prochlorperazine Prochlorperazine Maleate Active 10 MG Oral Every 6 Hours 90 September 12th, 2019 11:29am 09-  [facility] ([id number]) Yes Historical Provider, MD   cefdinir (OMNICEF) 300 MG capsule Take 1 capsule by mouth 2 times daily for 10 days Yes Anais Mcgraw DO   loratadine (CLARITIN) 10 MG capsule Take 10 mg by mouth daily Yes Historical Provider, MD   pantoprazole (PROTONIX) 40 MG tablet Take 1 tablet by mouth every morning (before breakfast) Yes Anais Mcgraw DO   MULTIPLE VITAMIN IV Infuse intravenously Yes Historical Provider, MD   ferrous sulfate (ALEXANDRA-IN-SOL) 75 (15 Fe) MG/ML solution Take 15 mg by mouth daily Indications: Iron Infusions, weekly (solu-medrol before infusions.)  Yes Historical Provider, MD   sucralfate (CARAFATE) 1 GM tablet Take 1 tablet by mouth 4 times daily for 15 days  Karla Willams,        Social History     Tobacco Use    Smoking status: Never Smoker    Smokeless tobacco: Never Used   Substance Use Topics    Alcohol use: Never     Frequency: Never    Drug use: Never            PHYSICAL EXAMINATION:  [ INSTRUCTIONS:  \"[x]\" Indicates a positive item  \"[]\" Indicates a negative item  -- DELETE ALL ITEMS NOT EXAMINED]  Vital Signs: (As obtained by patient/caregiver or practitioner observation)    Blood pressure- 113/58   heart rate-102   respiratory rate-    Temperature-97.3 pulse oximetry-     Constitutional: [] Appears well-developed and well-nourished [] No apparent distress      [x] [id number]  Mental status  [x] Alert and awake  [x] Oriented to person/place/time [x]Able to follow commands      Eyes:  EOM    [x]  Normal  []

## 2020-05-06 PROBLEM — R05.9 COUGH: Status: RESOLVED | Noted: 2017-02-02 | Resolved: 2020-05-06

## 2020-05-26 ENCOUNTER — VIRTUAL VISIT (OUTPATIENT)
Dept: PRIMARY CARE CLINIC | Age: 55
End: 2020-05-26
Payer: COMMERCIAL

## 2020-05-26 VITALS — TEMPERATURE: 100.5 F | HEIGHT: 65 IN | BODY MASS INDEX: 20.66 KG/M2 | WEIGHT: 124 LBS

## 2020-05-26 PROCEDURE — 99214 OFFICE O/P EST MOD 30 MIN: CPT | Performed by: INTERNAL MEDICINE

## 2020-05-26 NOTE — PROGRESS NOTES
TELEHEALTH VIDEO VISIT    HPI:    Selena Benjamin (:  1965) has requested an audio/video evaluation for the following concern(s):    Chief Complaint   Patient presents with    Sinus Problem     f/u for sinus issue that has been going on for awhile. Patient has been having problems with intermittent sinus congestion. She has had a temperature goes up and down. Today it is 100.5. She just had her IV iron so this could be a cause of this. She still has problems with epigastric discomfort, nausea vomiting and intermittent bouts of dehydration. She has multiple gastric ulcers but is unable to get an EGD at this time due to COVID-19 and her immunosuppressed state. She has a history of migraine headaches,  She has a history of knee pain and wears a knee brace. She has had multiple knee surgeries. She has a has history of chronic fatigue secondary to her iron deficiency anemia and neutropenia. CBC on May 11, 2020 ordered by her oncologist shows a white blood cell count of 3000, hemoglobin of 7.5, hematocrit 23.9, microcytic hypochromic indices. Platelet count was normal to 75,000. [place] showed increased segs  And decreased lymphocytes. Her serum iron was still low at 16 iron saturation low at 4. Total iron binding capacity and ferritin were normal.  CMP showed a nonfasting blood sugar of 108. Chloride 111, anion gap of 20    She still unable to go back to work because of her numerous medical problems with severe fatigue, intermittent abdominal pain, knee arthropathy and immunocompromised state      Review of Systems   Constitutional: Positive for fatigue, fever and unexpected weight change. Negative for appetite change. HENT: Positive for postnasal drip and sinus pressure. Negative for congestion, ear pain, facial swelling, rhinorrhea, sore throat, tinnitus and trouble swallowing. Eyes: Negative for photophobia, pain, discharge, itching and visual disturbance.    Respiratory: Positive for cough and shortness of breath. Negative for wheezing and stridor. Cardiovascular: Negative for chest pain, palpitations and leg swelling. Gastrointestinal: Positive for abdominal pain, nausea and vomiting. Negative for anal bleeding, blood in stool, constipation and diarrhea. Endocrine: Negative for cold intolerance, heat intolerance, polydipsia, polyphagia and polyuria. Genitourinary: Negative for difficulty urinating, dysuria, flank pain, frequency, hematuria and urgency. Musculoskeletal: Positive for arthralgias, gait problem and joint swelling. Negative for myalgias. Skin: Positive for pallor. Negative for color change and rash. Allergic/Immunologic: Negative for environmental allergies and food allergies. Neurological: Positive for light-headedness and headaches. Negative for dizziness, tremors, seizures, syncope, speech difficulty, weakness and numbness. Hematological: Negative for adenopathy. Bruises/bleeds easily. Psychiatric/Behavioral: Negative for agitation, behavioral problems, confusion, sleep disturbance and suicidal ideas. The patient is not nervous/anxious. Prior to Visit Medications    Medication Sig Taking?  Authorizing Provider   ondansetron (ZOFRAN) 4 MG tablet Ondansetron Ondansetron Hcl Active 4 MG Oral Every 6 hours as needed June 7th, 2017 2:47pm 06-  [facility] ([id number]) Yes Historical Provider, MD   albuterol-ipratropium (COMBIVENT RESPIMAT)  MCG/ACT AERS inhaler Albuterol / Ipratropium Albuterol/Ipratropium Active 1 PUFF Inhalation Four Times Daily June 7th, 2017 2:48pm 06-  [facility] ([id number]) Yes Historical Provider, MD   famotidine (PEPCID) 10 MG tablet Famotidine Famotidine Active 10 MG Oral Daily June 7th, 2017 2:46pm 06-  [facility] ([id number]) Yes Historical Provider, MD   prochlorperazine (COMPAZINE) 10 MG tablet Prochlorperazine Prochlorperazine Maleate Active 10 MG Oral Every 6 Hours 90 September 12th, 2019 11:29am 09-  [facility] ([id number]) Yes Historical Provider, MD   pantoprazole (PROTONIX) 40 MG tablet Take 1 tablet by mouth every morning (before breakfast) Yes Anais Mcgraw, DO   MULTIPLE VITAMIN IV Infuse intravenously Yes Historical Provider, MD   ferrous sulfate (ALEXANDRA-IN-SOL) 75 (15 Fe) MG/ML solution Take 15 mg by mouth daily Indications: Iron Infusions, weekly (solu-medrol before infusions.)  Yes Historical Provider, MD   loratadine (CLARITIN) 10 MG capsule Take 10 mg by mouth daily  Historical Provider, MD       Social History     Tobacco Use    Smoking status: Never Smoker    Smokeless tobacco: Never Used   Substance Use Topics    Alcohol use: Never     Frequency: Never    Drug use: Never            PHYSICAL EXAMINATION:  [ INSTRUCTIONS:  \"[x]\" Indicates a positive item  \"[]\" Indicates a negative item  -- DELETE ALL ITEMS NOT EXAMINED]  Vital Signs: (As obtained by patient/caregiver or practitioner observation)    Blood pressure- 110/64 Heart rate-107    Respiratory rate-    Temperature- 100.5 Pulse oximetry-     Constitutional: [x] Appears well-developed and well-nourished       [] Abnormal-   Mental status  [x] Alert and awake  [x] Oriented to person/place/time [x]Able to follow commands      Eyes:  EOM    [x]  Normal  [] Abnormal-  Sclera  [x]  Normal  [] Abnormal -         Discharge [x]  None visible  [] Abnormal -    HENT:   [] Normocephalic, atraumatic.   [] Abnormal   [] Mouth/Throat: Mucous membranes are moist.     External Ears [x] Normal  [] Abnormal-     Neck: [x] No visualized mass     Pulmonary/Chest: [x] Respiratory effort normal.  [x] No visualized signs of difficulty breathing or respiratory distress        [] Abnormal-     Neurological:        [x] No Facial Asymmetry (Cranial nerve 7 motor function) (limited exam to video visit)          [x] No gaze palsy        [] Abnormal-         Skin:        [x] No significant exanthematous lesions or discoloration noted on facial skin         [] Abnormal-            Psychiatric:       [x] Normal Affect [x] No Hallucinations        [] Abnormal-     Other pertinent observable physical exam findings-     ASSESSMENT/PLAN:  1. Gastroesophageal reflux disease without esophagitis  Continue her pantoprazole. Also use her famotidine as directed. 2. Multiple gastric ulcers  Continue medications and when COVID-19 situation has improved we will send to GI for EGD    3. Migraine without aura and without status migrainosus, not intractable  Continue medications for migraine    4. Knee arthropathy  Use her brace    5. Abdominal pain, epigastric  Unable to set the GI at this time so we will continue on her medications    6. Chronic fatigue  Off work for 4 weeks. JOSE form to be filled out and faxed to hospital    7. Other neutropenia ([address]  Follow-up with Dr. Antoinette De La Cruz    8. Iron deficiency anemia due to chronic blood loss  Iron transfusion per Dr. Antoinette De La Cruz    9. Sinusitis  This is chronic probably viral.  Just symptomatic treatment monitor temperature let me know if her symptoms do not improve. Return in about 1 week (around 6/2/2020). TeleMedicine video visit    This visit was performed as a virtual video visit using a synchronous, two-way, audio-video telehealth technology platform. Patient identification was verified at the start of the visit, including the patient's telephone number and physical location. I discussed with the patient the nature of our telehealth visits, that:     1. Due to the nature of an audio- video modality, the only components of a physical exam that could be done are the elements supported by direct observation. 2. I would evaluate the patient and recommend diagnostics and treatments based on my assessment. 3. If it was felt that the patient should be evaluated in clinic or an emergency room setting, then they would be directed there.   4. Our sessions are not being recorded and that personal health information is protected. 5. Our team would provide follow up care in person if/when the patient needs it. Patient does agree to proceed with telemedicine consultation. Patient's location: home address in [facility]  Physician  location other address in [facility] other people involved in callnone        Time spent: Greater than Not billed by time    This visit was completed virtually using D[employer]. me    --Jermyn Maria Del Rosario, DO on 5/27/2020 at 11:00 AM    An electronic signature was used to authenticate this note.

## 2020-05-27 PROBLEM — J32.1 CHRONIC FRONTAL SINUSITIS: Status: ACTIVE | Noted: 2020-05-27

## 2020-05-27 ASSESSMENT — ENCOUNTER SYMPTOMS
RHINORRHEA: 0
EYE ITCHING: 0
EYE PAIN: 0
EYE DISCHARGE: 0
BLOOD IN STOOL: 0
WHEEZING: 0
SORE THROAT: 0
FACIAL SWELLING: 0
ABDOMINAL PAIN: 1
NAUSEA: 1
VOMITING: 1
SHORTNESS OF BREATH: 1
STRIDOR: 0
CONSTIPATION: 0
COLOR CHANGE: 0
ANAL BLEEDING: 0
DIARRHEA: 0
PHOTOPHOBIA: 0
TROUBLE SWALLOWING: 0
COUGH: 1
SINUS PRESSURE: 1

## 2020-06-02 ENCOUNTER — OFFICE VISIT (OUTPATIENT)
Dept: PRIMARY CARE CLINIC | Age: 55
End: 2020-06-02
Payer: COMMERCIAL

## 2020-06-02 ENCOUNTER — TELEPHONE (OUTPATIENT)
Dept: PRIMARY CARE CLINIC | Age: 55
End: 2020-06-02

## 2020-06-02 VITALS
WEIGHT: 124 LBS | OXYGEN SATURATION: 99 % | TEMPERATURE: 97.7 F | BODY MASS INDEX: 20.66 KG/M2 | DIASTOLIC BLOOD PRESSURE: 68 MMHG | SYSTOLIC BLOOD PRESSURE: 122 MMHG | HEIGHT: 65 IN | RESPIRATION RATE: 16 BRPM | HEART RATE: 88 BPM

## 2020-06-02 PROBLEM — B02.29 POSTHERPETIC NEURALGIA: Status: ACTIVE | Noted: 2020-06-02

## 2020-06-02 PROCEDURE — G9899 SCRN MAM PERF RSLTS DOC: HCPCS | Performed by: INTERNAL MEDICINE

## 2020-06-02 PROCEDURE — 1036F TOBACCO NON-USER: CPT | Performed by: INTERNAL MEDICINE

## 2020-06-02 PROCEDURE — G8420 CALC BMI NORM PARAMETERS: HCPCS | Performed by: INTERNAL MEDICINE

## 2020-06-02 PROCEDURE — 3017F COLORECTAL CA SCREEN DOC REV: CPT | Performed by: INTERNAL MEDICINE

## 2020-06-02 PROCEDURE — 99214 OFFICE O/P EST MOD 30 MIN: CPT | Performed by: INTERNAL MEDICINE

## 2020-06-02 PROCEDURE — G8427 DOCREV CUR MEDS BY ELIG CLIN: HCPCS | Performed by: INTERNAL MEDICINE

## 2020-06-02 ASSESSMENT — ENCOUNTER SYMPTOMS
ANAL BLEEDING: 0
COUGH: 1
SORE THROAT: 1
CONSTIPATION: 0
VOMITING: 1
RHINORRHEA: 0
SINUS PRESSURE: 1
EYE DISCHARGE: 0
WHEEZING: 0
EYE PAIN: 0
TROUBLE SWALLOWING: 0
SINUS PAIN: 1
SHORTNESS OF BREATH: 1
NAUSEA: 1
STRIDOR: 0
COLOR CHANGE: 0
FACIAL SWELLING: 0
PHOTOPHOBIA: 0
BLOOD IN STOOL: 0
ABDOMINAL PAIN: 1
DIARRHEA: 0
EYE ITCHING: 0

## 2020-06-02 NOTE — PROGRESS NOTES
give herself IV saline. She also uses IV Zofran for her severe nausea. She has a history of chronic iron deficiency anemia. Her hemoglobins run in the 7 range. She gets IV iron once a week from her Hematologist Dr. Yaw Frankel. Her hemoglobin did go up transiently but just trended back downward. IV iron is giving her a lot of problems with swelling of her hands and pain. 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 The only thing she has not had is thrombocytopenia. Her immune system is very low and she obviously gets recurrent infections from this. Years ago she had numerous surgeries on her right knee. She was up at [facility] for most of these. She had to wear a brace for a long time but that was causing a lot of pinching and bruising. She has easy bruisability. She no longer wears a brace but her right knee is unstable and can buckle under any time. Because of all of this she is severely fatigued. She is very weak. It is very difficult for her to stand for over 5 minutes. If she walks she gets fatigued and has to rest after even after  20 feet. She is able to sit for a while but she has to get up and move around. She has a hard time with her hands as her  is weak. 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 She was having recurrent palpitations. She still has the palpitations but we have not had an EKG on her in a while. And I am not sure where the diagnostic EKG's are at this time. She has a Mediport, last placed 7 years ago, which is still functional.  One of these days it will need to be replaced. She has the port because of her weekly  IV iron  and because when she gets dehydrated she has to give herself extra saline IV. She usually gives herself  One thousand cc of Normal Saline at night. Review of Systems   Constitutional: Positive for chills, fatigue and fever. Negative for appetite change and unexpected weight change.    HENT: Positive Pulmonary effort is normal. No respiratory distress. Breath sounds: Normal breath sounds. No wheezing or rales. Chest:      Chest wall: No tenderness. Abdominal:      General: Bowel sounds are normal. There is no distension. Palpations: Abdomen is soft. There is no mass. Tenderness: There is abdominal tenderness. There is no guarding or rebound. Comments: Tender to palpation epigastric area   Musculoskeletal:         General: Deformity present. No tenderness. Comments: Deformity right knee with decreased range of motion to flexion   Lymphadenopathy:      Cervical: No cervical adenopathy. Skin:     General: Skin is warm and dry. Coloration: Skin is not pale. Findings: Bruising present. No erythema or rash. Neurological:      General: No focal deficit present. Mental Status: She is alert and oriented to person, place, and time. Cranial Nerves: No cranial nerve deficit. Sensory: No sensory deficit. Deep Tendon Reflexes: Reflexes normal.   Psychiatric:         Mood and Affect: Mood normal.         Behavior: Behavior normal.         Thought Content: Thought content normal.         Judgment: Judgment normal.          Last labs reviewed. ASSESSMENT & PLAN :   Problem List        Respiratory    Chronic frontal sinusitis     Which is after treatment with antibiotics when her respiratory symptoms flareup or become acute. Otherwise we will try and treat as conservatively as possible.   With the antibiotics that she has to take to treat these  infections, will have to watch for development of C. difficile infection or Candida vaginitis         Relevant Medications    loratadine (CLARITIN) 10 MG capsule       Digestive    Gastroesophageal reflux disease without esophagitis     Continue pantoprazole and famotidine hopefully when she recovers from her sinus infections we can get her to Dr. Lisa Benjamin for an EGD         Relevant Medications    pantoprazole ([address]) 40 MG tablet    ondansetron (ZOFRAN) 4 MG tablet    famotidine (PEPCID) 10 MG tablet    Bile duct stenosis - Primary     Await report from Dr. Johanny Berger         Multiple gastric ulcers     Continue PPI and H2 blockers. Keep an eye on her renal function         Relevant Medications    pantoprazole (PROTONIX) 40 MG tablet       Nervous and Auditory    Migraine without aura and without status migrainosus, not intractable     Use her triptan on a as needed basis         Postherpetic neuralgia     Unable to tolerate any medications at this time so we will just monitor            Musculoskeletal and Integument    Knee arthropathy     Do as best she can. She does not want any more surgeries            Other    Abdominal pain, epigastric     Clear liquids and soft diet when she gets symptomatic         Chronic fatigue     Upon evaluation of patient today I feel that we need to extend her leave of absence until at least mid September 2020 because of above medical conditions. Forms to be filled out for leave of absence along with her disability         Immunodeficiency ([facility]     Monitor. Follow-up with her hematologist         Neutropenia ([facility]     Weekly CBCs         Iron deficiency anemia due to chronic blood loss     Oral ferrous sulfate and IV iron         Hypoglobulinemia     Monitor. Hopefully will be able to get due to insurance plan that will pay for IVIG                Return in about 3 months (around 9/2/2020).        Roberta Red, DO  6/2/2020

## 2020-06-02 NOTE — ASSESSMENT & PLAN NOTE
Upon evaluation of patient today I feel that we need to extend her leave of absence until at least mid September 2020 because of above medical conditions.   Forms to be filled out for leave of absence along with her disability

## 2020-06-08 RX ORDER — CEFDINIR 300 MG/1
300 CAPSULE ORAL 2 TIMES DAILY
Qty: 20 CAPSULE | Refills: 0 | Status: SHIPPED | OUTPATIENT
Start: 2020-06-08 | End: 2020-06-18

## 2020-09-02 ENCOUNTER — OFFICE VISIT (OUTPATIENT)
Dept: PRIMARY CARE CLINIC | Age: 55
End: 2020-09-02
Payer: COMMERCIAL

## 2020-09-02 VITALS
BODY MASS INDEX: 19.58 KG/M2 | TEMPERATURE: 98.4 F | WEIGHT: 117.5 LBS | DIASTOLIC BLOOD PRESSURE: 60 MMHG | SYSTOLIC BLOOD PRESSURE: 100 MMHG | HEIGHT: 65 IN | OXYGEN SATURATION: 99 % | HEART RATE: 103 BPM | RESPIRATION RATE: 16 BRPM

## 2020-09-02 PROBLEM — E53.8 COBALAMIN DEFICIENCY: Status: ACTIVE | Noted: 2020-09-02

## 2020-09-02 PROBLEM — Z13.220 SCREENING FOR CHOLESTEROL LEVEL: Status: ACTIVE | Noted: 2020-09-02

## 2020-09-02 PROBLEM — M54.2 CERVICALGIA: Status: ACTIVE | Noted: 2020-09-02

## 2020-09-02 PROBLEM — D84.9 IMMUNOCOMPROMISED STATE (HCC): Status: ACTIVE | Noted: 2020-09-02

## 2020-09-02 PROBLEM — H66.002 NON-RECURRENT ACUTE SUPPURATIVE OTITIS MEDIA OF LEFT EAR WITHOUT SPONTANEOUS RUPTURE OF TYMPANIC MEMBRANE: Status: RESOLVED | Noted: 2020-04-27 | Resolved: 2020-09-02

## 2020-09-02 PROCEDURE — G8427 DOCREV CUR MEDS BY ELIG CLIN: HCPCS | Performed by: INTERNAL MEDICINE

## 2020-09-02 PROCEDURE — G8420 CALC BMI NORM PARAMETERS: HCPCS | Performed by: INTERNAL MEDICINE

## 2020-09-02 PROCEDURE — 3017F COLORECTAL CA SCREEN DOC REV: CPT | Performed by: INTERNAL MEDICINE

## 2020-09-02 PROCEDURE — G9899 SCRN MAM PERF RSLTS DOC: HCPCS | Performed by: INTERNAL MEDICINE

## 2020-09-02 PROCEDURE — 99213 OFFICE O/P EST LOW 20 MIN: CPT | Performed by: INTERNAL MEDICINE

## 2020-09-02 PROCEDURE — 1036F TOBACCO NON-USER: CPT | Performed by: INTERNAL MEDICINE

## 2020-09-02 RX ORDER — TIZANIDINE 2 MG/1
2 TABLET ORAL 3 TIMES DAILY PRN
Qty: 30 TABLET | Refills: 0 | Status: SHIPPED
Start: 2020-09-02 | End: 2021-04-01 | Stop reason: SDUPTHER

## 2020-09-02 ASSESSMENT — ENCOUNTER SYMPTOMS
VOMITING: 0
TROUBLE SWALLOWING: 0
WHEEZING: 0
EYE DISCHARGE: 0
SINUS PRESSURE: 0
COUGH: 0
EYE ITCHING: 0
CONSTIPATION: 0
COLOR CHANGE: 0
SORE THROAT: 1
DIARRHEA: 0
STRIDOR: 0
BLOOD IN STOOL: 0
EYE PAIN: 0
FACIAL SWELLING: 0
NAUSEA: 0
ANAL BLEEDING: 0
SHORTNESS OF BREATH: 0
ABDOMINAL PAIN: 1
RHINORRHEA: 0
SINUS PAIN: 0
PHOTOPHOBIA: 0

## 2020-09-02 NOTE — PROGRESS NOTES
2020    Name: Vernell Leroy : 1965 Sex: female  Age: 54 y.o. Subjective:  Chief Complaint   Patient presents with    3 Month Follow-Up        HPI     Bang zamora is no longer at [facility] but she still needs her leave of absence forms every 6 months. Next 1 due from 2020 till 3/6/2021  She also needs her FMLA form filled out from 2020 until 2021. She cannot get her permanent disability until she has been off work for 20 months forms will be faxed over by [address] at [facility]    . Patient has a history of recurrent upper respiratory infections for years which have been getting worse. She had recurrent episodes of pansinusitis and bronchitis. She had otitis media with drainage from her right ear. She worked at [facility]. Her last day of work was 3/16/2020. Her symptoms got significantly worse on 3/14/2020. She saw Dr. Kurt Morales. She then was told to stay home and her first day home was 3/17/2020. Since then she has had several other episodes of sinusitis, bronchitis. She developed herpes simplex. She had a hard time getting rid of the lesions and after she did, she had postherpetic neuralgia. Unfortunately she is intolerant to gabapentin and other medications. Unfortunately she has a history of immunodeficiency with hypogammaglobulinemia. This is one of the main reasons why she keeps getting infections. She had one dose of IVIG. Insurance refused to pay for other doses. She has a history of recurrent bile duct stenosis, multiple gastric ulcers and GERD without esophagitis. She has been unable to get an EGD because of the COVID-19 situation. Every time she is about to be scheduled for one  she comes down with an upper respiratory infection and is unable to get it done. She was initially under the care of a gastroenterologist in [facility]. He placed bile duct stents. .  This worked for a little bit but then she would have recurrent infections and they would have to come out. She has been on famotidine and pantoprazole for a long time for her gastric ulcers and GERD. She has recurrent abdominal pain especially in the epigastric area. She has recurrent nausea and vomiting. This makes her dehydrated and she has to give herself IV saline. She also uses IV Zofran for her severe nausea. She has a history of chronic iron deficiency anemia. Her hemoglobins run in the 7 range. She gets IV iron once a week from her Hematologist Dr. Maci Kraft. Her hemoglobin did go up transiently but just trended back downward. IV iron is giving her a lot of problems with swelling of her hands and pain. She was found to be deficient in vitamin B12 and has been started on over-the-counter B12    She has a history along with her hypogammaglobulinemia of neutropenia. The only thing she has not had is thrombocytopenia. Her immune system is very low and she obviously gets recurrent infections from this. Years ago she had numerous surgeries on her right knee. She was up at [facility] for most of these. She had to wear a brace for a long time but that was causing a lot of pinching and bruising. She has easy bruisability. She no longer wears a brace but her right knee is unstable and can buckle under any time. Because of all of this she is severely fatigued. She is very weak. It is very difficult for her to stand for over 5 minutes. If she walks she gets fatigued and has to rest after even after  20 feet. She is able to sit for a while but she has to get up and move around. She has a hard time with her hands as her  is weak. 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 She was having recurrent palpitations. She still has the palpitations but we have not had an EKG on her in a while. And I am not sure where the diagnostic EKG's are at this time. Her port was recently replaced by Dr. Jana Medina.   She has the port because of her weekly  IV iron  and because when she gets dehydrated she has to give herself extra saline IV. She usually gives herself  One thousand cc of Normal Saline at night. Review of Systems   Constitutional: Positive for fatigue. Negative for appetite change, chills, fever and unexpected weight change. HENT: Positive for congestion and sore throat. Negative for ear pain, facial swelling, hearing loss, rhinorrhea, sinus pressure, sinus pain, tinnitus and trouble swallowing. Eyes: Negative for photophobia, pain, discharge, itching and visual disturbance. Respiratory: Negative for cough, shortness of breath, wheezing and stridor. Cardiovascular: Positive for palpitations. Negative for chest pain and leg swelling. Gastrointestinal: Positive for abdominal pain. Negative for anal bleeding, blood in stool, constipation, diarrhea, nausea and vomiting. Endocrine: Negative for cold intolerance, heat intolerance, polydipsia, polyphagia and polyuria. Genitourinary: Negative for difficulty urinating, dysuria, flank pain, frequency, hematuria and urgency. Musculoskeletal: Positive for arthralgias, gait problem and myalgias. Negative for joint swelling. Cervicalgia   Skin: Negative for color change, pallor and rash. Allergic/Immunologic: Negative for environmental allergies and food allergies. Neurological: Positive for dizziness, weakness and light-headedness. Negative for tremors, seizures, syncope, speech difficulty, numbness and headaches. Hematological: Negative for adenopathy. Bruises/bleeds easily. Psychiatric/Behavioral: Negative for agitation, behavioral problems, confusion, sleep disturbance and suicidal ideas. The patient is not nervous/anxious.            Current Outpatient Medications:     tiZANidine (ZANAFLEX) 2 MG tablet, Take 1 tablet by mouth 3 times daily as needed (cervicalgia), Disp: 30 tablet, Rfl: 0    ondansetron (ZOFRAN) 4 MG tablet, Ondansetron Ondansetron Hcl Active 4 MG Oral Every 6 hours as needed June 7th, 2017 2:47pm 06-  [facility] ([id number]), Disp: , Rfl:     albuterol-ipratropium (COMBIVENT RESPIMAT)  MCG/ACT AERS inhaler, Albuterol / Ipratropium Albuterol/Ipratropium Active 1 PUFF Inhalation Four Times Daily June 7th, 2017 2:48pm 06-  [facility] ([id number]), Disp: , Rfl:     famotidine (PEPCID) 10 MG tablet, Famotidine Famotidine Active 10 MG Oral Daily June 7th, 2017 2:46pm 06-  [facility] ([id number]), Disp: , Rfl:     prochlorperazine (COMPAZINE) 10 MG tablet, Prochlorperazine Prochlorperazine Maleate Active 10 MG Oral Every 6 Hours 90 September 12th, 2019 11:29am 09-  [facility] ([id number]), Disp: , Rfl:     loratadine (CLARITIN) 10 MG capsule, Take 10 mg by mouth daily, Disp: , Rfl:     pantoprazole (PROTONIX) 40 MG tablet, Take 1 tablet by mouth every morning (before breakfast), Disp: 30 tablet, Rfl: 5    MULTIPLE VITAMIN IV, Infuse intravenously, Disp: , Rfl:     ferrous sulfate (ALEXANDRA-IN-SOL) 75 (15 Fe) MG/ML solution, Take 15 mg by mouth daily Indications: Iron Infusions, weekly (solu-medrol before infusions.) , Disp: , Rfl:      Allergies   Allergen Reactions    Cephalosporins Other (See Comments)    Morphine      rash(morphine)    Vancomycin Rash        Past Medical History:   Diagnosis Date    Abdominal pain, epigastric 6/11/2004    Chronic fatigue 7/10/2019    Immunodeficiency ([address] 7/10/2019    Knee arthropathy 7/10/2019    Migraine without aura and without status migrainosus, not intractable 7/10/2019       Health Maintenance Due   Topic Date Due    Hepatitis C screen  1965    Pneumococcal 0-64 years Vaccine (1 of 3 - PCV13) 03/11/1971    HIV screen  03/11/1980    DTaP/Tdap/Td vaccine (1 - Tdap) 03/11/1984    Cervical cancer screen  03/11/1986    Lipid screen  03/11/2005    Colon cancer screen colonoscopy  03/11/2015    Flu vaccine (1) 09/01/2020        Patient Active Problem List   Diagnosis    Abdominal pain, epigastric    Chronic fatigue    Immunodeficiency ([address]    Migraine without aura and without status migrainosus, not intractable    Knee arthropathy    Gastroesophageal reflux disease without esophagitis    Bile duct stenosis    Multiple gastric ulcers    Neutropenia (HCC)    Bronchitis    Iron deficiency anemia due to chronic blood loss    Acute pansinusitis    Hypoglobulinemia    Herpes zoster without complication    Hypokalemia    Acute bacterial conjunctivitis of both eyes    Chronic frontal sinusitis    Postherpetic neuralgia    Vitamin B12 deficiency    Immunocompromised state ([address]    Cervicalgia    Screening for cholesterol level        Past Surgical History:   Procedure Laterality Date    CHOLECYSTECTOMY, OPEN      HIP SURGERY Right     KNEE SURGERY Right     SKIN GRAFT      TONSILLECTOMY      TUNNELED CENTRAL VENOUS CATHETER W/ SUBCUTANEOUS PORT      several times        Family History   Problem Relation Age of Onset    Dementia Mother     Heart Disease Father     Migraines Sister         Social History     Tobacco Use    Smoking status: Never Smoker    Smokeless tobacco: Never Used   Substance Use Topics    Alcohol use: Never     Frequency: Never    Drug use: Never        Objective  Vitals:    09/02/20 0900   BP: 100/60   Pulse: 103   Resp: 16   Temp: 98.4 °F (36.9 °C)   SpO2: 99%   Weight: 117 lb 8 oz (53.3 kg)   Height: 5' 5\" (1.651 m)        Exam:  Physical Exam  Vitals signs reviewed. Constitutional:       General: She is not in acute distress. Appearance: She is well-developed and normal weight. She is not ill-appearing. HENT:      Head: Normocephalic. Right Ear: External ear normal.      Left Ear: External ear normal.      Nose: Nose normal.      Mouth/Throat:      Pharynx: No oropharyngeal exudate. Eyes:      General: No scleral icterus.         Right eye: No Flakito Sandoval is not in town anymore we will have to find a different gastroenterologist            Nervous and Auditory    Migraine without aura and without status migrainosus, not intractable    Relevant Medications    tiZANidine (ZANAFLEX) 2 MG tablet    Postherpetic neuralgia     Intolerant of gabapentin            Musculoskeletal and Integument    Knee arthropathy     Use her brace            Other    Abdominal pain, epigastric     Continue PPI, follow-up with GI         Chronic fatigue - Primary     Multifactorial         Immunodeficiency ([facility] [address]     Unable to get IVIG due to insurance concerns         Neutropenia (HCC)     Monitor CBC is         Iron deficiency anemia due to chronic blood loss     Continue IV iron and follow-up with hematologist         Hypoglobulinemia     Follow-up with hematologist         Vitamin B12 deficiency     Continue oral B12         Cervicalgia     Trial tizanidine 2 mg 3 times daily as needed, prescription management         Relevant Medications    tiZANidine (ZANAFLEX) 2 MG tablet    Screening for cholesterol level     Requisition for fasting cholesterol to be done with her next blood work for Dr. Uzma Youngblood: Cough           Return in about 3 months (around 12/2/2020).        Karen Reyes, DO  9/2/2020

## 2020-09-08 LAB
CHOLESTEROL: 154 MG/DL (ref 140–200)
HDLC SERPL-MCNC: 46 MG/DL (ref 35–85)
LDL CHOLESTEROL: 94 MG/DL
LDL/HDL RATIO: 2 RATIO
TRIGL SERPL-MCNC: 79 MG/DL (ref 41–189)

## 2020-09-29 ENCOUNTER — TELEPHONE (OUTPATIENT)
Dept: PRIMARY CARE CLINIC | Age: 55
End: 2020-09-29

## 2020-09-29 NOTE — TELEPHONE ENCOUNTER
Fax order for 0.9% normal saline with D5, 1 L every other night. Add 1 amp multivitamin to each liter. Duration 1 year to fax number [phone].   The pharmacist name is Esperanza nazario and her phone number is [phone]

## 2020-10-02 PROBLEM — Z13.220 SCREENING FOR CHOLESTEROL LEVEL: Status: RESOLVED | Noted: 2020-09-02 | Resolved: 2020-10-02

## 2021-01-05 ENCOUNTER — OFFICE VISIT (OUTPATIENT)
Dept: PRIMARY CARE CLINIC | Age: 56
End: 2021-01-05
Payer: COMMERCIAL

## 2021-01-05 VITALS
TEMPERATURE: 97.2 F | DIASTOLIC BLOOD PRESSURE: 66 MMHG | HEIGHT: 65 IN | HEART RATE: 87 BPM | WEIGHT: 120 LBS | SYSTOLIC BLOOD PRESSURE: 122 MMHG | OXYGEN SATURATION: 97 % | BODY MASS INDEX: 19.99 KG/M2

## 2021-01-05 DIAGNOSIS — R77.1 HYPOGLOBULINEMIA: ICD-10-CM

## 2021-01-05 DIAGNOSIS — D50.0 IRON DEFICIENCY ANEMIA DUE TO CHRONIC BLOOD LOSS: ICD-10-CM

## 2021-01-05 DIAGNOSIS — D84.9 IMMUNODEFICIENCY (HCC): ICD-10-CM

## 2021-01-05 DIAGNOSIS — D70.8 OTHER NEUTROPENIA (HCC): ICD-10-CM

## 2021-01-05 DIAGNOSIS — R05.9 COUGH: ICD-10-CM

## 2021-01-05 DIAGNOSIS — E53.8 VITAMIN B12 DEFICIENCY: ICD-10-CM

## 2021-01-05 DIAGNOSIS — K25.9 MULTIPLE GASTRIC ULCERS: ICD-10-CM

## 2021-01-05 DIAGNOSIS — R53.82 CHRONIC FATIGUE: ICD-10-CM

## 2021-01-05 DIAGNOSIS — D84.9 IMMUNOCOMPROMISED STATE (HCC): Primary | ICD-10-CM

## 2021-01-05 DIAGNOSIS — M17.10 KNEE ARTHROPATHY: ICD-10-CM

## 2021-01-05 DIAGNOSIS — Z12.31 ENCOUNTER FOR SCREENING MAMMOGRAM FOR MALIGNANT NEOPLASM OF BREAST: ICD-10-CM

## 2021-01-05 DIAGNOSIS — K21.9 GASTROESOPHAGEAL REFLUX DISEASE WITHOUT ESOPHAGITIS: ICD-10-CM

## 2021-01-05 DIAGNOSIS — K83.1 BILE DUCT STENOSIS: ICD-10-CM

## 2021-01-05 PROBLEM — B02.9 HERPES ZOSTER WITHOUT COMPLICATION: Status: RESOLVED | Noted: 2020-04-13 | Resolved: 2021-01-05

## 2021-01-05 PROBLEM — H10.33 ACUTE BACTERIAL CONJUNCTIVITIS OF BOTH EYES: Status: RESOLVED | Noted: 2020-04-27 | Resolved: 2021-01-05

## 2021-01-05 PROBLEM — E87.6 HYPOKALEMIA: Status: RESOLVED | Noted: 2020-04-13 | Resolved: 2021-01-05

## 2021-01-05 PROCEDURE — 1036F TOBACCO NON-USER: CPT | Performed by: INTERNAL MEDICINE

## 2021-01-05 PROCEDURE — 99214 OFFICE O/P EST MOD 30 MIN: CPT | Performed by: INTERNAL MEDICINE

## 2021-01-05 PROCEDURE — G8420 CALC BMI NORM PARAMETERS: HCPCS | Performed by: INTERNAL MEDICINE

## 2021-01-05 PROCEDURE — G8484 FLU IMMUNIZE NO ADMIN: HCPCS | Performed by: INTERNAL MEDICINE

## 2021-01-05 PROCEDURE — 3017F COLORECTAL CA SCREEN DOC REV: CPT | Performed by: INTERNAL MEDICINE

## 2021-01-05 PROCEDURE — G8427 DOCREV CUR MEDS BY ELIG CLIN: HCPCS | Performed by: INTERNAL MEDICINE

## 2021-01-05 ASSESSMENT — ENCOUNTER SYMPTOMS
STRIDOR: 0
FACIAL SWELLING: 0
SORE THROAT: 1
EYE ITCHING: 0
COUGH: 0
PHOTOPHOBIA: 0
TROUBLE SWALLOWING: 0
BLOOD IN STOOL: 0
WHEEZING: 0
CONSTIPATION: 0
NAUSEA: 0
ABDOMINAL PAIN: 1
RHINORRHEA: 0
ANAL BLEEDING: 0
DIARRHEA: 0
COLOR CHANGE: 0
SHORTNESS OF BREATH: 0
SINUS PAIN: 0
EYE PAIN: 0
SINUS PRESSURE: 0
VOMITING: 0
EYE DISCHARGE: 0

## 2021-01-05 ASSESSMENT — PATIENT HEALTH QUESTIONNAIRE - PHQ9
SUM OF ALL RESPONSES TO PHQ QUESTIONS 1-9: 0
1. LITTLE INTEREST OR PLEASURE IN DOING THINGS: 0

## 2021-01-05 NOTE — PROGRESS NOTES
2021    Name: Ian Ulloa : 1965 Sex: female  Age: 54 y.o. Subjective:  Chief Complaint   Patient presents with    3 Month Follow-Up        HPI     Renan zamora is no longer at [facility] but she still needs her leave of absence forms every 6 months. Next 1 due from 2020 till 3/6/2021  She also needs her FMLA form filled out from 2020 until 2021. She cannot get her permanent disability until she has been off work for 20 months forms will be faxed over by [address] at [facility]    . Patient has a history of recurrent upper respiratory infections for years which have been getting worse. Unfortunately she has a history of immunodeficiency with hypogammaglobulinemia. This is one of the main reasons why she keeps getting infections. She had one dose of IVIG. Insurance refused to pay for other doses. She has a history of recurrent bile duct stenosis, multiple gastric ulcers and GERD without esophagitis. She has been unable to get an EGD because of the COVID-19 situation. Every time she is about to be scheduled for one  she comes down with an upper respiratory infection and is unable to get it done. She was initially under the care of a gastroenterologist in [facility]. He placed bile duct stents. .  This worked for a little bit but then she would have recurrent infections and they would have to come out. She has been on famotidine and pantoprazole for a long time for her gastric ulcers and GERD. She has recurrent abdominal pain especially in the epigastric area. She has recurrent nausea and vomiting. This makes her dehydrated and she has to give herself IV saline. She also uses IV Zofran for her severe nausea. She has a history of chronic iron deficiency anemia. Her hemoglobins run in the 7 range. She gets IV iron once a week from her Hematologist Dr. Tre Hardwick.   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 Have recommended that she see a rheumatologist and  an immunologist.  She will check which facility she is allowed to go to in her insurance handbook. Review of Systems   Constitutional: Positive for fatigue. Negative for appetite change, chills, fever and unexpected weight change. HENT: Positive for congestion and sore throat. Negative for ear pain, facial swelling, hearing loss, rhinorrhea, sinus pressure, sinus pain, tinnitus and trouble swallowing. Eyes: Negative for photophobia, pain, discharge, itching and visual disturbance. Respiratory: Negative for cough, shortness of breath, wheezing and stridor. Cardiovascular: Positive for palpitations. Negative for chest pain and leg swelling. Gastrointestinal: Positive for abdominal pain. Negative for anal bleeding, blood in stool, constipation, diarrhea, nausea and vomiting. Endocrine: Negative for cold intolerance, heat intolerance, polydipsia, polyphagia and polyuria. Genitourinary: Negative for difficulty urinating, dysuria, flank pain, frequency, hematuria and urgency. Musculoskeletal: Positive for arthralgias, gait problem and myalgias. Negative for joint swelling. Cervicalgia   Skin: Negative for color change, pallor and rash. Allergic/Immunologic: Negative for environmental allergies and food allergies. Neurological: Positive for dizziness, weakness and light-headedness. Negative for tremors, seizures, syncope, speech difficulty, numbness and headaches. Hematological: Negative for adenopathy. Bruises/bleeds easily. Psychiatric/Behavioral: Negative for agitation, behavioral problems, confusion, sleep disturbance and suicidal ideas. The patient is not nervous/anxious.            Current Outpatient Medications:     tiZANidine (ZANAFLEX) 2 MG tablet, Take 1 tablet by mouth 3 times daily as needed (cervicalgia), Disp: 30 tablet, Rfl: 0    ondansetron (ZOFRAN) 4 MG tablet, Ondansetron Ondansetron Hcl Active 4 MG Oral Every 6 hours as needed June 7th, 2017 2:47pm 06-  [facility] ([id number]), Disp: , Rfl:     albuterol-ipratropium (COMBIVENT RESPIMAT)  MCG/ACT AERS inhaler, Albuterol / Ipratropium Albuterol/Ipratropium Active 1 PUFF Inhalation Four Times Daily June 7th, 2017 2:48pm 06-  [facility] ([id number]), Disp: , Rfl:     famotidine (PEPCID) 10 MG tablet, Famotidine Famotidine Active 10 MG Oral Daily June 7th, 2017 2:46pm 06-  [facility] ([id number]), Disp: , Rfl:     prochlorperazine (COMPAZINE) 10 MG tablet, Prochlorperazine Prochlorperazine Maleate Active 10 MG Oral Every 6 Hours 90 September 12th, 2019 11:29am 09-  [facility] ([id number]), Disp: , Rfl:     loratadine (CLARITIN) 10 MG capsule, Take 10 mg by mouth daily, Disp: , Rfl:     pantoprazole (PROTONIX) 40 MG tablet, Take 1 tablet by mouth every morning (before breakfast), Disp: 30 tablet, Rfl: 5    MULTIPLE VITAMIN IV, Infuse intravenously, Disp: , Rfl:     ferrous sulfate (ALEXANDRA-IN-SOL) 75 (15 Fe) MG/ML solution, Take 15 mg by mouth daily Indications: Iron Infusions, weekly (solu-medrol before infusions.) , Disp: , Rfl:      Allergies   Allergen Reactions    Cephalosporins Other (See Comments)    Morphine      rash(morphine)    Vancomycin Rash        Past Medical History:   Diagnosis Date    Abdominal pain, epigastric 6/11/2004    Acute bacterial conjunctivitis of both eyes 4/27/2020    Chronic fatigue 7/10/2019    Herpes zoster without complication 9/62/0397    Hypokalemia 4/13/2020    Immunodeficiency ([address] 7/10/2019    Knee arthropathy 7/10/2019    Migraine without aura and without status migrainosus, not intractable 7/10/2019       Health Maintenance Due   Topic Date Due    Hepatitis C screen  1965    Pneumococcal 0-64 years Vaccine (1 of 3 - PCV13) 03/11/1971    HIV screen 03/11/1980    DTaP/Tdap/Td vaccine (1 - Tdap) 03/11/1984    Cervical cancer screen  03/11/1986    Colon cancer screen colonoscopy  03/11/2015    Flu vaccine (1) 09/01/2020        Patient Active Problem List   Diagnosis    Abdominal pain, epigastric    Chronic fatigue    Immunodeficiency (HCC)    Migraine without aura and without status migrainosus, not intractable    Knee arthropathy    Gastroesophageal reflux disease without esophagitis    Bile duct stenosis    Multiple gastric ulcers    Neutropenia (HCC)    Bronchitis    Iron deficiency anemia due to chronic blood loss    Acute pansinusitis    Hypoglobulinemia    Chronic frontal sinusitis    Postherpetic neuralgia    Vitamin B12 deficiency    Immunocompromised state ([facility] [address]    Cervicalgia        Past Surgical History:   Procedure Laterality Date    CHOLECYSTECTOMY, OPEN      HIP SURGERY Right     KNEE SURGERY Right     SKIN GRAFT      TONSILLECTOMY      TUNNELED CENTRAL VENOUS CATHETER W/ SUBCUTANEOUS PORT      several times        Family History   Problem Relation Age of Onset    Dementia Mother     Heart Disease Father     Migraines Sister         Social History     Tobacco Use    Smoking status: Never Smoker    Smokeless tobacco: Never Used   Substance Use Topics    Alcohol use: Never     Frequency: Never    Drug use: Never        Objective  Vitals:    01/05/21 0931   BP: 122/66   Site: Right Upper Arm   Position: Sitting   Cuff Size: Medium Adult   Pulse: 87   Temp: 97.2 °F (36.2 °C)   TempSrc: Temporal   SpO2: 97%   Weight: 120 lb (54.4 kg)   Height: 5' 5\" (1.651 m)        Exam:  Physical Exam  Vitals signs reviewed. Constitutional:       General: She is not in acute distress. Appearance: She is well-developed and normal weight. She is not ill-appearing. HENT:      Head: Normocephalic and atraumatic.       Right Ear: External ear normal.      Left Ear: External ear normal.      Nose: Nose normal.      Mouth/Throat: Pharynx: No oropharyngeal exudate. Eyes:      General: No scleral icterus. Right eye: No discharge. Left eye: No discharge. Conjunctiva/sclera: Conjunctivae normal.      Pupils: Pupils are equal, round, and reactive to light. Comments: Pale conjunctiva   Neck:      Musculoskeletal: Normal range of motion and neck supple. Thyroid: No thyromegaly. Cardiovascular:      Rate and Rhythm: Normal rate and regular rhythm. Heart sounds: Normal heart sounds. No murmur. No friction rub. No gallop. Pulmonary:      Effort: Pulmonary effort is normal. No respiratory distress. Breath sounds: Normal breath sounds. No wheezing or rales. Chest:      Chest wall: No tenderness. Abdominal:      General: Abdomen is flat. Bowel sounds are normal. There is no distension. Palpations: Abdomen is soft. There is no mass. Tenderness: There is abdominal tenderness. There is no guarding or rebound. Comments: Tender to palpation epigastric area   Musculoskeletal:         General: Deformity present. No tenderness. Comments: Deformity right knee with decreased range of motion to flexion   Lymphadenopathy:      Cervical: No cervical adenopathy. Skin:     General: Skin is warm and dry. Coloration: Skin is not pale. Findings: Bruising present. No erythema or rash. Neurological:      General: No focal deficit present. Mental Status: She is alert and oriented to person, place, and time. Cranial Nerves: No cranial nerve deficit. Sensory: No sensory deficit. Deep Tendon Reflexes: Reflexes normal.   Psychiatric:         Mood and Affect: Mood normal.         Behavior: Behavior normal.         Thought Content: Thought content normal.         Judgment: Judgment normal.          Last labs reviewed.       ASSESSMENT & PLAN :   Problem List        Digestive    Gastroesophageal reflux disease without esophagitis     Continue PPI and H2 blocker         Relevant

## 2021-01-05 NOTE — ASSESSMENT & PLAN NOTE
Continue vitamin  B12 IM weekly.   Consideration toward adding vitamin B12 1000 mcg orally every day

## 2021-01-05 NOTE — ASSESSMENT & PLAN NOTE
Continue weekly CBCs and iron studies per Dr. Ruslan Bruner.   IV iron as directed and may need transfusion if hemoglobin drops below 6

## 2021-03-02 DIAGNOSIS — Z12.31 ENCOUNTER FOR SCREENING MAMMOGRAM FOR MALIGNANT NEOPLASM OF BREAST: ICD-10-CM

## 2021-04-01 DIAGNOSIS — M54.2 CERVICALGIA: ICD-10-CM

## 2021-04-01 RX ORDER — TIZANIDINE 2 MG/1
2 TABLET ORAL 3 TIMES DAILY PRN
Qty: 30 TABLET | Refills: 0 | Status: SHIPPED
Start: 2021-04-01 | End: 2021-07-13 | Stop reason: SDUPTHER

## 2021-04-06 ENCOUNTER — OFFICE VISIT (OUTPATIENT)
Dept: PRIMARY CARE CLINIC | Age: 56
End: 2021-04-06
Payer: COMMERCIAL

## 2021-04-06 VITALS
TEMPERATURE: 97.2 F | HEIGHT: 65 IN | DIASTOLIC BLOOD PRESSURE: 62 MMHG | OXYGEN SATURATION: 98 % | WEIGHT: 117 LBS | HEART RATE: 69 BPM | BODY MASS INDEX: 19.49 KG/M2 | SYSTOLIC BLOOD PRESSURE: 120 MMHG

## 2021-04-06 DIAGNOSIS — K21.9 GASTROESOPHAGEAL REFLUX DISEASE WITHOUT ESOPHAGITIS: ICD-10-CM

## 2021-04-06 DIAGNOSIS — K83.1 BILE DUCT STENOSIS: ICD-10-CM

## 2021-04-06 DIAGNOSIS — R53.82 CHRONIC FATIGUE: ICD-10-CM

## 2021-04-06 DIAGNOSIS — E86.0 DEHYDRATION: ICD-10-CM

## 2021-04-06 DIAGNOSIS — D84.9 IMMUNOCOMPROMISED STATE (HCC): ICD-10-CM

## 2021-04-06 DIAGNOSIS — D50.0 IRON DEFICIENCY ANEMIA DUE TO CHRONIC BLOOD LOSS: ICD-10-CM

## 2021-04-06 DIAGNOSIS — D70.8 OTHER NEUTROPENIA (HCC): ICD-10-CM

## 2021-04-06 DIAGNOSIS — G44.52 NEW DAILY PERSISTENT HEADACHE: Primary | ICD-10-CM

## 2021-04-06 DIAGNOSIS — S90.01XA TRAUMATIC ECCHYMOSIS OF RIGHT ANKLE, INITIAL ENCOUNTER: ICD-10-CM

## 2021-04-06 DIAGNOSIS — K25.9 MULTIPLE GASTRIC ULCERS: ICD-10-CM

## 2021-04-06 DIAGNOSIS — D84.9 IMMUNODEFICIENCY (HCC): ICD-10-CM

## 2021-04-06 PROCEDURE — 3017F COLORECTAL CA SCREEN DOC REV: CPT | Performed by: INTERNAL MEDICINE

## 2021-04-06 PROCEDURE — 1036F TOBACCO NON-USER: CPT | Performed by: INTERNAL MEDICINE

## 2021-04-06 PROCEDURE — 99213 OFFICE O/P EST LOW 20 MIN: CPT | Performed by: INTERNAL MEDICINE

## 2021-04-06 PROCEDURE — G8420 CALC BMI NORM PARAMETERS: HCPCS | Performed by: INTERNAL MEDICINE

## 2021-04-06 PROCEDURE — G8427 DOCREV CUR MEDS BY ELIG CLIN: HCPCS | Performed by: INTERNAL MEDICINE

## 2021-04-06 RX ORDER — TRAMADOL HYDROCHLORIDE 50 MG/1
50 TABLET ORAL EVERY 6 HOURS PRN
Qty: 28 TABLET | Refills: 0 | Status: SHIPPED | OUTPATIENT
Start: 2021-04-06 | End: 2021-04-13

## 2021-04-06 SDOH — ECONOMIC STABILITY: FOOD INSECURITY: WITHIN THE PAST 12 MONTHS, THE FOOD YOU BOUGHT JUST DIDN'T LAST AND YOU DIDN'T HAVE MONEY TO GET MORE.: NEVER TRUE

## 2021-04-06 SDOH — ECONOMIC STABILITY: FOOD INSECURITY: WITHIN THE PAST 12 MONTHS, YOU WORRIED THAT YOUR FOOD WOULD RUN OUT BEFORE YOU GOT MONEY TO BUY MORE.: NEVER TRUE

## 2021-04-06 ASSESSMENT — ENCOUNTER SYMPTOMS
COUGH: 0
ANAL BLEEDING: 0
COLOR CHANGE: 0
WHEEZING: 0
SINUS PAIN: 0
DIARRHEA: 0
SINUS PRESSURE: 0
CONSTIPATION: 0
SHORTNESS OF BREATH: 0
VOMITING: 0
FACIAL SWELLING: 0
NAUSEA: 0
EYE PAIN: 0
TROUBLE SWALLOWING: 0
EYE DISCHARGE: 0
ABDOMINAL PAIN: 0
PHOTOPHOBIA: 0
BLOOD IN STOOL: 0
STRIDOR: 0
EYE ITCHING: 0
RHINORRHEA: 0

## 2021-04-06 NOTE — ASSESSMENT & PLAN NOTE
If ever she gets better she really should follow-up with GI at [facility]

## 2021-04-06 NOTE — ASSESSMENT & PLAN NOTE
Trial combination of tizanidine and tramadol. [place] report reviewed with the last time she received a problematic medication was in August 2020  abhishek her surgeon gave her a short course of Percocet following her port placement.

## 2021-04-06 NOTE — PROGRESS NOTES
2021    Name: Falguni Castaneda : 1965 Sex: female  Age: 64 y.o. Subjective:  Chief Complaint   Patient presents with    3 Month Follow-Up        HPI     Stacey zamora is no longer at [facility] but she still needs her leave of absence forms every 6 months. Next 1 due from 2020 till 3/6/2021  She also needs her FMLA form filled out from 2020 until 2021. She cannot get her permanent disability until she has been off work for 20 months. Forms will be faxed over by HR department at [facility]    . Patient has a history of recurrent upper respiratory infections for years which have been getting worse. Unfortunately she has a history of immunodeficiency with hypogammaglobulinemia. This is one of the main reasons why she keeps getting infections. She had one dose of IVIG. Insurance refused to pay for other doses. She has a history of recurrent bile duct stenosis, multiple gastric ulcers and GERD without esophagitis. She has been unable to get an EGD because of the COVID-19 situation. Every time she is about to be scheduled for one  she comes down with an upper respiratory infection and is unable to get it done. She was initially under the care of a gastroenterologist in [facility]. He placed bile duct stents. .  This worked for a little bit but then she would have recurrent infections and they would have to come out. She has been on famotidine and pantoprazole for a long time for her gastric ulcers and GERD. She has recurrent abdominal pain especially in the epigastric area. She has recurrent nausea and vomiting. This makes her dehydrated and she has to give herself IV saline. She also uses IV Zofran for her severe nausea. She has a history of chronic iron deficiency anemia. Her hemoglobins run in the 7 range. She gets IV iron once a week from her Hematologist Dr. Geovani Simmons.   Her hemoglobin did go up transiently but just trended back downward. IV iron is giving her a lot of problems with swelling of her hands and pain. She was found to be deficient in vitamin B12 and has been started on monthly IM B12 shots 1000 mcg. Despite this her B12 level still in the lower limits of normal.  Despite  her IV iron she never  raised her iron level that much. Hematologist has mentioned a repeat bone marrow biopsy but this has not been scheduled. 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 The only thing she has not had is thrombocytopenia. Her immune system is very low and she obviously gets recurrent infections from this. Years ago she had numerous surgeries on her right knee. She was up at [facility] for most of these. She had to wear a brace for a long time but that was causing a lot of pinching and bruising. She has easy bruisability. She no longer wears a brace but her right knee is unstable and can buckle under any time. Her orthopedic surgeon at [facility], [facility]. at one time wondered if she had an autoimmune disorder that was causing her to reject all the things that he was doing to her knee. On April 4 she hit her right foot and ankle on a kitchen drawer. She had a large swollen ecchymotic area which has since resolved and now she has a lot of black and blue marks on her foot. She may have a small avulsion fracture of her ankle. Because of all of this she is severely fatigued. She is very weak. It is very difficult for her to stand for over 5 minutes. If she walks she gets fatigued and has to rest after even after  20 feet. She is able to sit for a while but she has to get up and move around. She has a hard time with her hands as her  is weak. 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 She was having recurrent palpitations. She still has the palpitations but we have not had an EKG on her in a while. And I am not sure where the diagnostic EKG's are at this time.     Her port was  replaced by Dr. Chaya Ruvalcaba about 8 months ago. She has the port because of her weekly  IV iron  and because when she gets dehydrated she has to give herself extra saline IV. She usually gives herself  One thousand cc of Normal Saline at night    Her port has not been functioning for 3 weeks. This has been checked by various providers. She is scheduled to have a new port placed by Dr. Chaya Ruvalcaba on Thursday April 8, 2021. Since her port is not working she has become dehydrated her hemoglobin actually went up. She has a severe headache mainly frontal temporal which she has had for 3 weeks. Nothing touches it. She also had premature ovarian failure diagnosed by Dr. Newton Montes. With her immunoglobulin deficiency, biliary stenosis, recurrent URIs, and her other problems, I wonder if she does indeed have some type of autoimmune disease causing connective tissue disorder. Have recommended that she see a rheumatologist and  an immunologist.  She will check which facility she is allowed to go to in her insurance handbook. Review of Systems   Constitutional: Positive for fatigue. Negative for appetite change, chills, fever and unexpected weight change. HENT: Negative for congestion, ear pain, facial swelling, hearing loss, rhinorrhea, sinus pressure, sinus pain, tinnitus and trouble swallowing. Eyes: Negative for photophobia, pain, discharge, itching and visual disturbance. Respiratory: Negative for cough, shortness of breath, wheezing and stridor. Cardiovascular: Negative for chest pain, palpitations and leg swelling. Gastrointestinal: Negative for abdominal pain, anal bleeding, blood in stool, constipation, diarrhea, nausea and vomiting. Endocrine: Negative for cold intolerance, heat intolerance, polydipsia, polyphagia and polyuria. Genitourinary: Negative for difficulty urinating, dysuria, flank pain, frequency, hematuria and urgency. Musculoskeletal: Positive for arthralgias, gait problem and myalgias. Negative for joint swelling. Cervicalgia   Skin: Negative for color change, pallor and rash. Allergic/Immunologic: Negative for environmental allergies and food allergies. Neurological: Positive for weakness, light-headedness and headaches. Negative for dizziness, tremors, seizures, syncope, speech difficulty and numbness. Hematological: Negative for adenopathy. Bruises/bleeds easily. Psychiatric/Behavioral: Negative for agitation, behavioral problems, confusion, sleep disturbance and suicidal ideas. The patient is not nervous/anxious. Current Outpatient Medications:     traMADol (ULTRAM) 50 MG tablet, Take 1 tablet by mouth every 6 hours as needed for Pain for up to 7 days. Intended supply: 7 days.  Take lowest dose possible to manage pain, Disp: 28 tablet, Rfl: 0    tiZANidine (ZANAFLEX) 2 MG tablet, Take 1 tablet by mouth 3 times daily as needed (cervicalgia), Disp: 30 tablet, Rfl: 0    ondansetron (ZOFRAN) 4 MG tablet, Ondansetron Ondansetron Hcl Active 4 MG Oral Every 6 hours as needed June 7th, 2017 2:47pm 06-  [facility] ([id number]), Disp: , Rfl:     albuterol-ipratropium (COMBIVENT RESPIMAT)  MCG/ACT AERS inhaler, Albuterol / Ipratropium Albuterol/Ipratropium Active 1 PUFF Inhalation Four Times Daily June 7th, 2017 2:48pm 06-  [facility] ([id number]), Disp: , Rfl:     famotidine (PEPCID) 10 MG tablet, Famotidine Famotidine Active 10 MG Oral Daily June 7th, 2017 2:46pm 06-  [facility] ([id number]), Disp: , Rfl:     prochlorperazine (COMPAZINE) 10 MG tablet, Prochlorperazine Prochlorperazine Maleate Active 10 MG Oral Every 6 Hours 90 September 12th, 2019 11:29am 09-  [facility] ([id number]), Disp: , Rfl:     loratadine (CLARITIN) 10 MG capsule, Take 10 mg by mouth daily, Disp: , Rfl:     pantoprazole (PROTONIX) 40 MG tablet, Take 1 tablet by mouth every morning (before breakfast), Disp: 30 tablet, Rfl: 5    MULTIPLE VITAMIN IV, Infuse intravenously, Disp: , Rfl:     ferrous sulfate (ALEXANDRA-IN-SOL) 75 (15 Fe) MG/ML solution, Take 15 mg by mouth daily Indications: Iron Infusions, weekly (solu-medrol before infusions.) , Disp: , Rfl:     Ferrous Sulfate (IRON PO), Infuse intravenously, Disp: , Rfl:      Allergies   Allergen Reactions    Cephalosporins Other (See Comments)    Morphine      rash(morphine)    Vancomycin Rash        Past Medical History:   Diagnosis Date    Abdominal pain, epigastric 6/11/2004    Acute bacterial conjunctivitis of both eyes 4/27/2020    Chronic fatigue 7/10/2019    Herpes zoster without complication 7/30/8976    Hypokalemia 4/13/2020    Immunodeficiency ([address] 7/10/2019    Knee arthropathy 7/10/2019    Migraine without aura and without status migrainosus, not intractable 7/10/2019       Health Maintenance Due   Topic Date Due    Hepatitis C screen  Never done    Pneumococcal 0-64 years Vaccine (1 of 3 - PCV13) Never done    HIV screen  Never done    DTaP/Tdap/Td vaccine (1 - Tdap) Never done    Cervical cancer screen  Never done    Colon cancer screen colonoscopy  Never done        Patient Active Problem List   Diagnosis    Abdominal pain, epigastric    Chronic fatigue    Immunodeficiency ([address]    Migraine without aura and without status migrainosus, not intractable    Knee arthropathy    Gastroesophageal reflux disease without esophagitis    Bile duct stenosis    Multiple gastric ulcers    Neutropenia (HCC)    Bronchitis    Iron deficiency anemia due to chronic blood loss    Acute pansinusitis    Hypoglobulinemia    Chronic frontal sinusitis    Postherpetic neuralgia    Vitamin B12 deficiency    Immunocompromised state ([address]    Cervicalgia    Dehydration    New daily persistent headache    Traumatic ecchymosis of right ankle        Past Surgical History:   Procedure Laterality Date    CHOLECYSTECTOMY, OPEN      HIP SURGERY Right     KNEE SURGERY Right     SKIN GRAFT      TONSILLECTOMY      TUNNELED CENTRAL VENOUS CATHETER W/ SUBCUTANEOUS PORT      several times        Family History   Problem Relation Age of Onset    Dementia Mother     Heart Disease Father     Migraines Sister         Social History     Tobacco Use    Smoking status: Never Smoker    Smokeless tobacco: Never Used   Substance Use Topics    Alcohol use: Never     Frequency: Never    Drug use: Never        Objective  Vitals:    04/06/21 0959   BP: 120/62   Site: Right Upper Arm   Position: Sitting   Cuff Size: Medium Adult   Pulse: 69   Temp: 97.2 °F (36.2 °C)   TempSrc: Temporal   SpO2: 98%   Weight: 117 lb (53.1 kg)   Height: 5' 5\" (1.651 m)        Exam:  Physical Exam  Vitals signs reviewed. Constitutional:       General: She is not in acute distress. Appearance: She is well-developed and normal weight. She is not ill-appearing. HENT:      Head: Normocephalic and atraumatic. Right Ear: External ear normal.      Left Ear: External ear normal.      Nose: Nose normal.      Mouth/Throat:      Pharynx: No oropharyngeal exudate. Eyes:      General: No scleral icterus. Right eye: No discharge. Left eye: No discharge. Conjunctiva/sclera: Conjunctivae normal.      Pupils: Pupils are equal, round, and reactive to light. Comments: Pale conjunctiva   Neck:      Musculoskeletal: Normal range of motion and neck supple. Thyroid: No thyromegaly. Cardiovascular:      Rate and Rhythm: Normal rate and regular rhythm. Heart sounds: Normal heart sounds. No murmur. No friction rub. No gallop. Pulmonary:      Effort: Pulmonary effort is normal. No respiratory distress. Breath sounds: Normal breath sounds. No wheezing or rales. Chest:      Chest wall: No tenderness. Abdominal:      General: Abdomen is flat. Bowel sounds are normal. There is no distension. Palpations: Abdomen is soft. There is no mass. Tenderness:  There is no abdominal tenderness. There is no guarding or rebound. Musculoskeletal:         General: Deformity present. No tenderness. Comments: Deformity right knee with decreased range of motion to flexion    Ecchymoses on the dorsum of her right foot  and lateral malleolus. Tender to palpation lateral aspect of right foot. Lymphadenopathy:      Cervical: No cervical adenopathy. Skin:     General: Skin is warm and dry. Coloration: Skin is not pale. Findings: Bruising present. No erythema or rash. Neurological:      General: No focal deficit present. Mental Status: She is alert and oriented to person, place, and time. Cranial Nerves: No cranial nerve deficit. Sensory: No sensory deficit. Deep Tendon Reflexes: Reflexes normal.   Psychiatric:         Mood and Affect: Mood normal.         Behavior: Behavior normal.         Thought Content: Thought content normal.         Judgment: Judgment normal.          Last labs reviewed.       ASSESSMENT & PLAN :   Problem List        Digestive    Gastroesophageal reflux disease without esophagitis     Continue with famotidine and pantoprazole         Relevant Medications    pantoprazole (PROTONIX) 40 MG tablet    ondansetron (ZOFRAN) 4 MG tablet    famotidine (PEPCID) 10 MG tablet    Bile duct stenosis     If ever she gets better she really should follow-up with GI at [facility]         Multiple gastric ulcers     Continue PPI         Relevant Medications    pantoprazole (PROTONIX) 40 MG tablet       Other    Chronic fatigue     Multifactorial         Immunodeficiency ([address]     Follow-up with hematologist         Neutropenia ([address]     Monitor WBCs regularly         Iron deficiency anemia due to chronic blood loss     Monitor iron studies and RBCs         Immunocompromised state ([address]     Avoid drinks with lots of people, wear mask, social distancing and wash hands frequently         Dehydration     Increase oral fluid intake with water and Gatorade. Get port placed so that she can give herself her IV fluids         New daily persistent headache - Primary     Trial combination of tizanidine and tramadol. [facility] report reviewed with the last time she received a problematic medication was in August 2020  abhishek her surgeon gave her a short course of Percocet following her port placement. Relevant Medications    tiZANidine (ZANAFLEX) 2 MG tablet    traMADol (ULTRAM) 50 MG tablet    Traumatic ecchymosis of right ankle     Elevate, monitor, may need an x-ray to rule out an avulsion fracture if this does not improve                Return in about 3 months (around 7/6/2021), or gerd,fatigue.        Reji Johns, DO  4/6/2021

## 2021-04-06 NOTE — ASSESSMENT & PLAN NOTE
Increase oral fluid intake with water and Gatorade.   Get port placed so that she can give herself her IV fluids

## 2021-05-06 PROBLEM — E86.0 DEHYDRATION: Status: RESOLVED | Noted: 2021-04-06 | Resolved: 2021-05-06

## 2021-07-13 ENCOUNTER — OFFICE VISIT (OUTPATIENT)
Dept: PRIMARY CARE CLINIC | Age: 56
End: 2021-07-13
Payer: COMMERCIAL

## 2021-07-13 VITALS
WEIGHT: 120 LBS | HEIGHT: 65 IN | OXYGEN SATURATION: 96 % | SYSTOLIC BLOOD PRESSURE: 134 MMHG | DIASTOLIC BLOOD PRESSURE: 68 MMHG | BODY MASS INDEX: 19.99 KG/M2 | HEART RATE: 97 BPM | TEMPERATURE: 97.8 F

## 2021-07-13 DIAGNOSIS — K83.1 BILE DUCT STENOSIS: Primary | ICD-10-CM

## 2021-07-13 DIAGNOSIS — D70.8 OTHER NEUTROPENIA (HCC): ICD-10-CM

## 2021-07-13 DIAGNOSIS — D50.0 IRON DEFICIENCY ANEMIA DUE TO CHRONIC BLOOD LOSS: ICD-10-CM

## 2021-07-13 DIAGNOSIS — M54.2 CERVICALGIA: ICD-10-CM

## 2021-07-13 DIAGNOSIS — D84.9 IMMUNOCOMPROMISED STATE (HCC): ICD-10-CM

## 2021-07-13 DIAGNOSIS — M17.10 KNEE ARTHROPATHY: ICD-10-CM

## 2021-07-13 DIAGNOSIS — K25.9 MULTIPLE GASTRIC ULCERS: ICD-10-CM

## 2021-07-13 DIAGNOSIS — K21.9 GASTROESOPHAGEAL REFLUX DISEASE WITHOUT ESOPHAGITIS: ICD-10-CM

## 2021-07-13 PROBLEM — D72.819 LEUKOPENIA: Status: ACTIVE | Noted: 2021-07-13

## 2021-07-13 PROCEDURE — 1036F TOBACCO NON-USER: CPT | Performed by: INTERNAL MEDICINE

## 2021-07-13 PROCEDURE — 3017F COLORECTAL CA SCREEN DOC REV: CPT | Performed by: INTERNAL MEDICINE

## 2021-07-13 PROCEDURE — G8420 CALC BMI NORM PARAMETERS: HCPCS | Performed by: INTERNAL MEDICINE

## 2021-07-13 PROCEDURE — 99213 OFFICE O/P EST LOW 20 MIN: CPT | Performed by: INTERNAL MEDICINE

## 2021-07-13 PROCEDURE — G8427 DOCREV CUR MEDS BY ELIG CLIN: HCPCS | Performed by: INTERNAL MEDICINE

## 2021-07-13 RX ORDER — CYANOCOBALAMIN 1000 UG/ML
INJECTION INTRAMUSCULAR; SUBCUTANEOUS
COMMUNITY
Start: 2020-07-13

## 2021-07-13 RX ORDER — TIZANIDINE 2 MG/1
2 TABLET ORAL 3 TIMES DAILY PRN
Qty: 30 TABLET | Refills: 1 | Status: SHIPPED
Start: 2021-07-13 | End: 2021-10-18 | Stop reason: SDUPTHER

## 2021-07-13 RX ORDER — SODIUM FERRIC GLUCONATE COMPLEX IN SUCROSE 12.5 MG/ML
INJECTION INTRAVENOUS
COMMUNITY
Start: 2021-04-06

## 2021-07-13 ASSESSMENT — ENCOUNTER SYMPTOMS
DIARRHEA: 0
COUGH: 0
COLOR CHANGE: 0
WHEEZING: 0
NAUSEA: 0
ANAL BLEEDING: 0
TROUBLE SWALLOWING: 0
ABDOMINAL PAIN: 0
SHORTNESS OF BREATH: 0
FACIAL SWELLING: 0
EYE ITCHING: 0
EYE DISCHARGE: 0
CONSTIPATION: 0
SINUS PRESSURE: 0
EYE PAIN: 0
VOMITING: 0
BLOOD IN STOOL: 0
SINUS PAIN: 0
RHINORRHEA: 0
STRIDOR: 0
PHOTOPHOBIA: 0
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
## 2021-07-19 ENCOUNTER — OFFICE VISIT (OUTPATIENT)
Dept: PRIMARY CARE CLINIC | Age: 56
End: 2021-07-19
Payer: COMMERCIAL

## 2021-07-19 VITALS
BODY MASS INDEX: 19.83 KG/M2 | DIASTOLIC BLOOD PRESSURE: 68 MMHG | WEIGHT: 119 LBS | HEIGHT: 65 IN | TEMPERATURE: 98.3 F | OXYGEN SATURATION: 99 % | SYSTOLIC BLOOD PRESSURE: 132 MMHG | RESPIRATION RATE: 20 BRPM | HEART RATE: 105 BPM

## 2021-07-19 DIAGNOSIS — D50.0 IRON DEFICIENCY ANEMIA DUE TO CHRONIC BLOOD LOSS: ICD-10-CM

## 2021-07-19 DIAGNOSIS — D84.9 IMMUNODEFICIENCY (HCC): ICD-10-CM

## 2021-07-19 DIAGNOSIS — R05.9 COUGH: ICD-10-CM

## 2021-07-19 DIAGNOSIS — J40 BRONCHITIS: Primary | ICD-10-CM

## 2021-07-19 PROCEDURE — 3017F COLORECTAL CA SCREEN DOC REV: CPT | Performed by: INTERNAL MEDICINE

## 2021-07-19 PROCEDURE — G8420 CALC BMI NORM PARAMETERS: HCPCS | Performed by: INTERNAL MEDICINE

## 2021-07-19 PROCEDURE — 99213 OFFICE O/P EST LOW 20 MIN: CPT | Performed by: INTERNAL MEDICINE

## 2021-07-19 PROCEDURE — 1036F TOBACCO NON-USER: CPT | Performed by: INTERNAL MEDICINE

## 2021-07-19 PROCEDURE — G8427 DOCREV CUR MEDS BY ELIG CLIN: HCPCS | Performed by: INTERNAL MEDICINE

## 2021-07-19 RX ORDER — CEFDINIR 300 MG/1
300 CAPSULE ORAL 2 TIMES DAILY
Qty: 20 CAPSULE | Refills: 0 | Status: SHIPPED | OUTPATIENT
Start: 2021-07-19 | End: 2021-07-29

## 2021-07-19 ASSESSMENT — ENCOUNTER SYMPTOMS
EYE ITCHING: 0
BLOOD IN STOOL: 0
EYE PAIN: 0
EYE DISCHARGE: 0
RHINORRHEA: 0
TROUBLE SWALLOWING: 0
VOMITING: 0
FACIAL SWELLING: 0
WHEEZING: 0
SHORTNESS OF BREATH: 0
ABDOMINAL PAIN: 0
COUGH: 1
NAUSEA: 0
SINUS PAIN: 0
SINUS PRESSURE: 0
DIARRHEA: 0
PHOTOPHOBIA: 0
CONSTIPATION: 0
STRIDOR: 0
ANAL BLEEDING: 0
COLOR CHANGE: 0

## 2021-07-19 NOTE — ASSESSMENT & PLAN NOTE
hemoglobin down to 5.2.   I strongly recommend that she accept the offer of a unit of packed red blood cells when she goes back next week to hematology

## 2021-07-19 NOTE — PROGRESS NOTES
2021    Name: Claritza Mock : 1965 Sex: female  Age: 64 y.o. Subjective:  Chief Complaint   Patient presents with    Cough    Congestion    Otalgia        Cough  Associated symptoms include ear pain, headaches and myalgias. Pertinent negatives include no chest pain, chills, fever, rash, rhinorrhea, shortness of breath or wheezing. There is no history of environmental allergies. Otalgia   Associated symptoms include coughing and headaches. Pertinent negatives include no abdominal pain, diarrhea, hearing loss, rash, rhinorrhea or vomiting. .  Patient has a history of recurrent upper respiratory infections for years which have been getting worse. Since Saturday she has had chills, low-grade fever, scratchy throat and a nonproductive cough. No diaphoresis. She always has some nausea but she has not had any vomiting. She has had a headache from coughing    . She had her blood work done today and her hemoglobin was down to 5.2. Hematocrit of 17.2. White blood cell count of 2016 with increased segs. BMP showed chloride of 108, kidney function normal rest of lites normal nonfasting blood sugar of 103, sed rate was normal.  Apparently was offered a transfusion of 1 unit of packed red blood cells by hematology but patient declined. Unfortunately she has a history of immunodeficiency with hypogammaglobulinemia. This is one of the main reasons why she keeps getting infections. She had one dose of IVIG. Insurance refused to pay for other doses. She has a history of recurrent bile duct stenosis, multiple gastric ulcers and GERD without esophagitis. She has been unable to get an EGD because of the COVID-19 situation. Every time she is about to be scheduled for one  she comes down with an upper respiratory infection and is unable to get it done. She was initially under the care of a gastroenterologist in [facility]. He placed bile duct stents. .  This worked for a little bit but then she would have recurrent infections and they would have to come out. She has been on famotidine and pantoprazole for a long time for her gastric ulcers and GERD. She has recurrent abdominal pain especially in the epigastric area. She has recurrent nausea and vomiting. This makes her dehydrated and she has to give herself IV saline. She also uses IV Zofran for her severe nausea. She contacted her [facility] clinic doctor who wants to get her stomach to come down before we will attempt to do an EGD on her. She has recurrent vomitings most times undigested food. I wonder if she has an element of gastroparesis. I talked her about getting a gastric emptying time but she is reluctant to do it. I really think she needs to see her GI doctor for an EGD and ERCP. She has a history of chronic iron deficiency anemia. Her hemoglobin and hematocrit have significantly declined over the last year. .  She gets IV iron once a week from her Hematologist Dr. Amberly Gaspar. Despite this her serum iron Is only 8 with iron saturation of 2. IV iron is giving her a lot of problems with swelling of her hands and pain. She was found to be deficient in vitamin B12 and has been started on monthly IM B12 shots 1000 mcg. Despite this her B12 level is still only 334. Hematologist has mentioned a repeat bone marrow biopsy but this has not been scheduled. She has a history along with her hypogammaglobulinemia of neutropenia last WBC count was 3800. The only thing she has not had is thrombocytopenia. Her immune system is very low and she obviously gets recurrent infections from this. Years ago she had numerous surgeries on her right knee. She was up at [facility] for most of these. She had to wear a brace for a long time but that was causing a lot of pinching and bruising. She has easy bruisability. She no longer wears a brace but her right knee is unstable and can buckle under any time.   Her orthopedic surgeon at inhaler, Albuterol / Ipratropium Albuterol/Ipratropium Active 1 PUFF Inhalation Four Times Daily June 7th, 2017 2:48pm 06-  [facility] ([id number]), Disp: , Rfl:     famotidine (PEPCID) 10 MG tablet, Famotidine Famotidine Active 10 MG Oral Daily June 7th, 2017 2:46pm 06-  [facility] ([id number]), Disp: , Rfl:     prochlorperazine (COMPAZINE) 10 MG tablet, Prochlorperazine Prochlorperazine Maleate Active 10 MG Oral Every 6 Hours 90 September 12th, 2019 11:29am 09-  [facility] ([id number]), Disp: , Rfl:     loratadine (CLARITIN) 10 MG capsule, Take 10 mg by mouth daily, Disp: , Rfl:     pantoprazole (PROTONIX) 40 MG tablet, Take 1 tablet by mouth every morning (before breakfast), Disp: 30 tablet, Rfl: 5    MULTIPLE VITAMIN IV, Infuse intravenously, Disp: , Rfl:     ferrous sulfate (ALEXANDRA-IN-SOL) 75 (15 Fe) MG/ML solution, Take 15 mg by mouth daily Indications: Iron Infusions, weekly (solu-medrol before infusions.) , Disp: , Rfl:      Allergies   Allergen Reactions    Cephalosporins Other (See Comments)    Morphine      rash(morphine)    Vancomycin Rash        Past Medical History:   Diagnosis Date    Abdominal pain, epigastric 6/11/2004    Acute bacterial conjunctivitis of both eyes 4/27/2020    Chronic fatigue 7/10/2019    Herpes zoster without complication 4/48/1406    Hypokalemia 4/13/2020    Immunodeficiency [address] 7/10/2019    Knee arthropathy 7/10/2019    Migraine without aura and without status migrainosus, not intractable 7/10/2019       Health Maintenance Due   Topic Date Due    Hepatitis C screen  Never done    Pneumococcal 0-64 years Vaccine (1 of 4 - PCV13) Never done    HIV screen  Never done    DTaP/Tdap/Td vaccine (1 - Tdap) Never done    Cervical cancer screen  Never done    Colon cancer screen colonoscopy  Never done        Patient Active Problem List   Diagnosis    Abdominal pain, epigastric    Chronic fatigue    Immunodeficiency ([address]    Migraine without aura and without status migrainosus, not intractable    Knee arthropathy    Gastroesophageal reflux disease without esophagitis    Bile duct stenosis    Multiple gastric ulcers    Neutropenia (HCC)    Bronchitis    Cough    Iron deficiency anemia due to chronic blood loss    Acute pansinusitis    Hypoglobulinemia    Chronic frontal sinusitis    Postherpetic neuralgia    Vitamin B12 deficiency    Immunocompromised state ([address]    Cervicalgia    New daily persistent headache    Traumatic ecchymosis of right ankle    Leukopenia        Past Surgical History:   Procedure Laterality Date    CHOLECYSTECTOMY, OPEN      HIP SURGERY Right     KNEE SURGERY Right     SKIN GRAFT      TONSILLECTOMY      TUNNELED CENTRAL VENOUS CATHETER W/ SUBCUTANEOUS PORT      several times        Family History   Problem Relation Age of Onset    Dementia Mother     Heart Disease Father     Migraines Sister         Social History     Tobacco Use    Smoking status: Never Smoker    Smokeless tobacco: Never Used   Substance Use Topics    Alcohol use: Never    Drug use: Never        Objective  Vitals:    07/19/21 1509   BP: 132/68   Pulse: 105   Resp: 20   Temp: 98.3 °F (36.8 °C)   TempSrc: Temporal   SpO2: 99%   Weight: 119 lb (54 kg)   Height: 5' 5\" (1.651 m)        Exam:  Physical Exam  Vitals reviewed. Constitutional:       General: She is not in acute distress. Appearance: She is well-developed and normal weight. She is not ill-appearing. HENT:      Head: Normocephalic and atraumatic. Right Ear: Tympanic membrane, ear canal and external ear normal.      Left Ear: Tympanic membrane, ear canal and external ear normal.      Nose: Nose normal.      Mouth/Throat:      Mouth: Mucous membranes are moist.      Pharynx: Oropharynx is clear. No oropharyngeal exudate or posterior oropharyngeal erythema. Eyes:      General: No scleral icterus.         Right eye: No Tessalon Perles 3 times daily as needed cough         Relevant Orders    XR CHEST (2 VW)    Iron deficiency anemia due to chronic blood loss       hemoglobin down to 5.2. I strongly recommend that she accept the offer of a unit of packed red blood cells when she goes back next week to hematology                Return in about 9 days (around 7/28/2021), or if symptoms worsen or fail to improve.        Alison Tucker, DO  7/19/2021

## 2021-07-19 NOTE — ASSESSMENT & PLAN NOTE
chest x-ray PA and lateral.  Cefdinir 300 mg twice daily for 10 days. Take cefdinir. If no better she will need to be seen by somebody in the office or at [facility] next week otherwise I will see her on her regular scheduled appointment.

## 2021-08-18 PROBLEM — R05.9 COUGH: Status: RESOLVED | Noted: 2017-02-02 | Resolved: 2021-08-18

## 2021-10-18 ENCOUNTER — OFFICE VISIT (OUTPATIENT)
Dept: PRIMARY CARE CLINIC | Age: 56
End: 2021-10-18
Payer: COMMERCIAL

## 2021-10-18 VITALS
BODY MASS INDEX: 19.99 KG/M2 | SYSTOLIC BLOOD PRESSURE: 124 MMHG | WEIGHT: 120 LBS | HEIGHT: 65 IN | RESPIRATION RATE: 16 BRPM | DIASTOLIC BLOOD PRESSURE: 60 MMHG

## 2021-10-18 DIAGNOSIS — K25.9 MULTIPLE GASTRIC ULCERS: ICD-10-CM

## 2021-10-18 DIAGNOSIS — D70.8 OTHER NEUTROPENIA (HCC): ICD-10-CM

## 2021-10-18 DIAGNOSIS — K21.9 GASTROESOPHAGEAL REFLUX DISEASE WITHOUT ESOPHAGITIS: ICD-10-CM

## 2021-10-18 DIAGNOSIS — D50.0 IRON DEFICIENCY ANEMIA DUE TO CHRONIC BLOOD LOSS: Primary | ICD-10-CM

## 2021-10-18 DIAGNOSIS — M54.2 CERVICALGIA: ICD-10-CM

## 2021-10-18 PROBLEM — B02.29 POSTHERPETIC NEURALGIA: Status: RESOLVED | Noted: 2020-06-02 | Resolved: 2021-10-18

## 2021-10-18 PROBLEM — S90.01XA: Status: RESOLVED | Noted: 2021-04-06 | Resolved: 2021-10-18

## 2021-10-18 PROCEDURE — 3017F COLORECTAL CA SCREEN DOC REV: CPT | Performed by: INTERNAL MEDICINE

## 2021-10-18 PROCEDURE — G8484 FLU IMMUNIZE NO ADMIN: HCPCS | Performed by: INTERNAL MEDICINE

## 2021-10-18 PROCEDURE — G8420 CALC BMI NORM PARAMETERS: HCPCS | Performed by: INTERNAL MEDICINE

## 2021-10-18 PROCEDURE — 1036F TOBACCO NON-USER: CPT | Performed by: INTERNAL MEDICINE

## 2021-10-18 PROCEDURE — G8427 DOCREV CUR MEDS BY ELIG CLIN: HCPCS | Performed by: INTERNAL MEDICINE

## 2021-10-18 PROCEDURE — 99213 OFFICE O/P EST LOW 20 MIN: CPT | Performed by: INTERNAL MEDICINE

## 2021-10-18 RX ORDER — TIZANIDINE 2 MG/1
2 TABLET ORAL 3 TIMES DAILY PRN
Qty: 30 TABLET | Refills: 1 | Status: SHIPPED
Start: 2021-10-18 | End: 2022-01-18 | Stop reason: SDUPTHER

## 2021-10-18 RX ORDER — PANTOPRAZOLE SODIUM 40 MG/1
40 TABLET, DELAYED RELEASE ORAL
Qty: 30 TABLET | Refills: 5 | Status: SHIPPED
Start: 2021-10-18 | End: 2022-08-31 | Stop reason: SDUPTHER

## 2021-10-18 ASSESSMENT — ENCOUNTER SYMPTOMS
EYE PAIN: 0
STRIDOR: 0
EYE ITCHING: 0
WHEEZING: 0
VOMITING: 0
ABDOMINAL PAIN: 0
COUGH: 1
BLOOD IN STOOL: 0
ANAL BLEEDING: 0
FACIAL SWELLING: 0
PHOTOPHOBIA: 0
SINUS PRESSURE: 0
NAUSEA: 0
DIARRHEA: 0
SINUS PAIN: 0
CONSTIPATION: 0
COLOR CHANGE: 0
SHORTNESS OF BREATH: 0
TROUBLE SWALLOWING: 0
EYE DISCHARGE: 0
RHINORRHEA: 0

## 2021-10-18 NOTE — PROGRESS NOTES
10/18/2021    Name: Geni Mcadams : 1965 Sex: female  Age: 64 y.o. Subjective:  Chief Complaint   Patient presents with    3 Month Follow-Up        Cough  Associated symptoms include ear pain, headaches and myalgias. Pertinent negatives include no chest pain, chills, fever, rash, rhinorrhea, shortness of breath or wheezing. There is no history of environmental allergies. Otalgia   Associated symptoms include coughing and headaches. Pertinent negatives include no abdominal pain, diarrhea, hearing loss, rash, rhinorrhea or vomiting. .  Patient has a history of recurrent upper respiratory infections for years which have been getting worse. .  She had her blood work done today and her hemoglobin was down to 5.1. Hematocrit of 18. White blood cell count of 2016 with increased segs. BMP showed chloride of 108, kidney function normal rest of lites normal nonfasting blood sugar of 103, sed rate was normal.     Unfortunately she has a history of immunodeficiency with hypogammaglobulinemia. This is one of the main reasons why she keeps getting infections. She had one dose of IVIG. Insurance refused to pay for other doses. She has a history of recurrent bile duct stenosis, multiple gastric ulcers and GERD without esophagitis. She has been unable to get an EGD because of the COVID-19 situation. Every time she is about to be scheduled for one  she comes down with an upper respiratory infection and is unable to get it done. She was initially under the care of a gastroenterologist in [facility]. He placed bile duct stents. .  This worked for a little bit but then she would have recurrent infections and they would have to come out. She has been on famotidine and pantoprazole for a long time for her gastric ulcers and GERD. She has recurrent abdominal pain especially in the epigastric area. She has recurrent nausea and vomiting.   This makes her dehydrated and she has to give herself IV saline. She also uses IV Zofran for her severe nausea. She contacted her [facility] clinic doctor who wants to get her stomach symptoms to calm down before he will attempt to do an EGD on her. She has recurrent vomitings most times undigested food. I wonder if she has an element of gastroparesis. I talked her about getting a gastric emptying time but she is reluctant to do it. I really think she needs to see her GI doctor for an EGD and ERCP. She has a history of chronic iron deficiency anemia. Her hemoglobin and hematocrit have significantly declined over the last year. .  She gets IV iron once a week from her Hematologist Dr. Pedro Arthur. Despite this her serum iron Is only 8 with iron saturation of 2. IV iron is giving her a lot of problems with swelling of her hands and pain. She was found to be deficient in vitamin B12 and has been started on monthly IM B12 shots 1000 mcg. Despite this her B12 level is still only 334. Hematologist has mentioned a repeat bone marrow biopsy but this has not been scheduled. She has a history along with her hypogammaglobulinemia of neutropenia last WBC count was 3800. The only thing she has not had is thrombocytopenia. Her immune system is very deficient and she obviously gets recurrent infections from this. Years ago she had numerous surgeries on her right knee. She was up at [facility] for most of these. She had to wear a brace for a long time but that was causing a lot of pinching and bruising. She has easy bruisability. She no longer wears a brace but her right knee is unstable and can buckle under any time. Her orthopedic surgeon at [facility]. at one time wondered if she had an autoimmune disorder that was causing her to reject all the things that he was doing to her knee. Because of all of this she is severely fatigued. She is very weak. It is very difficult for her to stand for over 5 minutes.   If she walks she gets fatigued and has to rest after even after  20 feet. She is able to sit for a while but she has to get up and move around. She has a hard time with her hands as her  is weak. 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 She was having recurrent palpitations. She still has the palpitations but we have not had an EKG on her in a while. And I am not sure where the diagnostic EKG's are at this time. Her port was  replaced by Dr. Lorin Benson about 8 months ago. She has the port because of her weekly  IV iron  and because when she gets dehydrated she has to give herself extra saline IV. She usually gives herself  One thousand cc of Normal Saline at [facility]. She also had premature ovarian failure diagnosed by Dr. Lizette Donato. With her immunoglobulin deficiency, biliary stenosis, recurrent URIs, and her other problems, I wonder if she does indeed have some type of autoimmune disease causing connective tissue disorder. Have recommended that she see a rheumatologist and  an immunologist.  She will check which facility she is allowed to go to in her insurance handbook. Review of Systems   Constitutional: Positive for fatigue. Negative for appetite change, chills, fever and unexpected weight change. HENT: Positive for ear pain. Negative for congestion, facial swelling, hearing loss, rhinorrhea, sinus pressure, sinus pain, tinnitus and trouble swallowing. Eyes: Negative for photophobia, pain, discharge, itching and visual disturbance. Respiratory: Positive for cough. Negative for shortness of breath, wheezing and stridor. Cardiovascular: Negative for chest pain, palpitations and leg swelling. Gastrointestinal: Negative for abdominal pain, anal bleeding, blood in stool, constipation, diarrhea, nausea and vomiting. Endocrine: Negative for cold intolerance, heat intolerance, polydipsia, polyphagia and polyuria. Genitourinary: Negative for difficulty urinating, dysuria, flank pain, frequency, hematuria and urgency. Musculoskeletal: Positive for arthralgias, gait problem and myalgias. Negative for joint swelling. Cervicalgia   Skin: Negative for color change, pallor and rash. Allergic/Immunologic: Negative for environmental allergies and food allergies. Neurological: Positive for weakness, light-headedness and headaches. Negative for dizziness, tremors, seizures, syncope, speech difficulty and numbness. Hematological: Negative for adenopathy. Bruises/bleeds easily. Psychiatric/Behavioral: Negative for agitation, behavioral problems, confusion, sleep disturbance and suicidal ideas. The patient is not nervous/anxious.            Current Outpatient Medications:     tiZANidine (ZANAFLEX) 2 MG tablet, Take 1 tablet by mouth 3 times daily as needed (cervicalgia), Disp: 30 tablet, Rfl: 1    pantoprazole (PROTONIX) 40 MG tablet, Take 1 tablet by mouth every morning (before breakfast), Disp: 30 tablet, Rfl: 5    cyanocobalamin 1000 MCG/ML injection, MONTHLY, Disp: , Rfl:     ferric gluconate (FERRLECIT) 12.5 MG/ML injection, Ferric Sodium Gluconate Comple (Ferrlecit 62.5 Mg/5 Ml Vial) 62.5 MG/5 ML Vial Active 125 MG IV Weekly April 6th, 2021 3:13pm mondays, Disp: , Rfl:     Ferrous Sulfate (IRON PO), Infuse intravenously, Disp: , Rfl:     ondansetron (ZOFRAN) 4 MG tablet, Ondansetron Ondansetron Hcl Active 4 MG Oral Every 6 hours as needed June 7th, 2017 2:47pm 06-  [facility] ([id number]), Disp: , Rfl:     albuterol-ipratropium (COMBIVENT RESPIMAT)  MCG/ACT AERS inhaler, Albuterol / Ipratropium Albuterol/Ipratropium Active 1 PUFF Inhalation Four Times Daily June 7th, 2017 2:48pm 06-  [facility] ([id number]), Disp: , Rfl:     famotidine (PEPCID) 10 MG tablet, Famotidine Famotidine Active 10 MG Oral Daily June 7th, 2017 2:46pm 06-  [facility] ([id number]), Disp: , Rfl:     MULTIPLE VITAMIN IV, Infuse intravenously, Disp: , Rfl:     ferrous sulfate (ALEXANDRA-IN-SOL) 75 (15 Fe) MG/ML solution, Take 15 mg by mouth daily Indications: Iron Infusions, weekly (solu-medrol before infusions.) , Disp: , Rfl:     prochlorperazine (COMPAZINE) 10 MG tablet, Prochlorperazine Prochlorperazine Maleate Active 10 MG Oral Every 6 Hours 90 September 12th, 2019 11:29am 09-  [facility] ([id number]) (Patient not taking: Reported on 10/18/2021), Disp: , Rfl:      Allergies   Allergen Reactions    Cefepime      Other reaction(s): blood pressure changes    Morphine      rash(morphine)  Other reaction(s): emesis, blood pressure changes ,rash    Vancomycin Rash     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    Cephalosporins Other (See Comments)        Past Medical History:   Diagnosis Date    Abdominal pain, epigastric 6/11/2004    Acute bacterial conjunctivitis of both eyes 4/27/2020    Chronic fatigue 7/10/2019    Herpes zoster without complication 5/58/6040    Hypokalemia 4/13/2020    Immunodeficiency ([address] 7/10/2019    Knee arthropathy 7/10/2019    Migraine without aura and without status migrainosus, not intractable 7/10/2019       Health Maintenance Due   Topic Date Due    Hepatitis C screen  Never done    Pneumococcal 0-64 years Vaccine (1 of 4 - PCV13) Never done    HIV screen  Never done    DTaP/Tdap/Td vaccine (1 - Tdap) Never done    Cervical cancer screen  Never done    Colon cancer screen colonoscopy  Never done    COVID-19 Vaccine (3 - Moderna risk 3-dose series) 03/08/2021        Patient Active Problem List   Diagnosis    Abdominal pain, epigastric    Chronic fatigue    Immunodeficiency ([address]    Migraine without aura and without status migrainosus, not intractable    Knee arthropathy    Gastroesophageal reflux disease without esophagitis    Bile duct stenosis    Multiple gastric ulcers    Neutropenia (HCC)    Bronchitis    Iron deficiency anemia due to chronic blood loss    Acute pansinusitis    Hypoglobulinemia    Chronic frontal sinusitis    Vitamin B12 deficiency    Immunocomprom[facility] state ([facility]    Cervicalgia    New daily persistent headache    Leukopenia        Past Surgical History:   Procedure Laterality Date    CHOLECYSTECTOMY, OPEN      HIP SURGERY Right     KNEE SURGERY Right     SKIN GRAFT      TONSILLECTOMY      TUNNELED CENTRAL VENOUS CATHETER W/ SUBCUTANEOUS PORT      several times        Family History   Problem Relation Age of Onset    Dementia Mother     Heart Disease Father     Migraines Sister         Social History     Tobacco Use    Smoking status: Never Smoker    Smokeless tobacco: Never Used   Substance Use Topics    Alcohol use: Never    Drug use: Never        Objective  Vitals:    10/18/21 1335   BP: 124/60   Resp: 16   Weight: 120 lb (54.4 kg)   Height: 5' 5\" (1.651 m)        Exam:  Physical Exam  Vitals reviewed. Constitutional:       General: She is not in acute distress. Appearance: She is well-developed and normal weight. She is not ill-appearing. HENT:      Head: Normocephalic and atraumatic. Right Ear: Tympanic membrane, ear canal and external ear normal.      Left Ear: Tympanic membrane, ear canal and external ear normal.      Nose: Nose normal.      Mouth/Throat:      Mouth: Mucous membranes are moist.      Pharynx: Oropharynx is clear. No oropharyngeal exudate or posterior oropharyngeal erythema. Eyes:      General: No scleral icterus. Right eye: No discharge. Left eye: No discharge. Conjunctiva/sclera: Conjunctivae normal.      Comments: Pale conjunctiva   Neck:      Thyroid: No thyromegaly. Cardiovascular:      Rate and Rhythm: Normal rate and regular rhythm. Heart sounds: Normal heart sounds. No murmur heard. No friction rub. No gallop. Pulmonary:      Effort: Pulmonary effort is normal. No respiratory distress. Breath sounds: Rhonchi present. No wheezing or rales. Chest:      Chest wall: No tenderness.

## 2021-10-18 NOTE — ASSESSMENT & PLAN NOTE
iron infusions as per hematology.   She was given a red blood cell transfusion but this did not last and her hemoglobin came back down within a few days

## 2021-12-06 DIAGNOSIS — M62.830 BACK MUSCLE SPASM: Primary | ICD-10-CM

## 2021-12-06 RX ORDER — CYCLOBENZAPRINE HCL 10 MG
10 TABLET ORAL NIGHTLY PRN
Qty: 30 TABLET | Refills: 0 | Status: SHIPPED | OUTPATIENT
Start: 2021-12-06 | End: 2021-12-16

## 2022-01-18 ENCOUNTER — OFFICE VISIT (OUTPATIENT)
Dept: PRIMARY CARE CLINIC | Age: 57
End: 2022-01-18
Payer: COMMERCIAL

## 2022-01-18 VITALS
SYSTOLIC BLOOD PRESSURE: 134 MMHG | HEART RATE: 105 BPM | HEIGHT: 65 IN | WEIGHT: 121 LBS | BODY MASS INDEX: 20.16 KG/M2 | OXYGEN SATURATION: 95 % | DIASTOLIC BLOOD PRESSURE: 68 MMHG | TEMPERATURE: 98.4 F

## 2022-01-18 DIAGNOSIS — Z12.31 ENCOUNTER FOR SCREENING MAMMOGRAM FOR BREAST CANCER: ICD-10-CM

## 2022-01-18 DIAGNOSIS — K25.9 MULTIPLE GASTRIC ULCERS: ICD-10-CM

## 2022-01-18 DIAGNOSIS — D50.0 IRON DEFICIENCY ANEMIA DUE TO CHRONIC BLOOD LOSS: ICD-10-CM

## 2022-01-18 DIAGNOSIS — D84.9 IMMUNOCOMPROMISED STATE (HCC): ICD-10-CM

## 2022-01-18 DIAGNOSIS — U07.1 COVID-19: Primary | ICD-10-CM

## 2022-01-18 DIAGNOSIS — K21.9 GASTROESOPHAGEAL REFLUX DISEASE WITHOUT ESOPHAGITIS: ICD-10-CM

## 2022-01-18 DIAGNOSIS — D70.8 OTHER NEUTROPENIA (HCC): ICD-10-CM

## 2022-01-18 DIAGNOSIS — R77.1 HYPOGLOBULINEMIA: ICD-10-CM

## 2022-01-18 DIAGNOSIS — M54.2 CERVICALGIA: ICD-10-CM

## 2022-01-18 PROCEDURE — G8420 CALC BMI NORM PARAMETERS: HCPCS | Performed by: INTERNAL MEDICINE

## 2022-01-18 PROCEDURE — 99213 OFFICE O/P EST LOW 20 MIN: CPT | Performed by: INTERNAL MEDICINE

## 2022-01-18 PROCEDURE — 1036F TOBACCO NON-USER: CPT | Performed by: INTERNAL MEDICINE

## 2022-01-18 PROCEDURE — G8484 FLU IMMUNIZE NO ADMIN: HCPCS | Performed by: INTERNAL MEDICINE

## 2022-01-18 PROCEDURE — G8427 DOCREV CUR MEDS BY ELIG CLIN: HCPCS | Performed by: INTERNAL MEDICINE

## 2022-01-18 PROCEDURE — 3017F COLORECTAL CA SCREEN DOC REV: CPT | Performed by: INTERNAL MEDICINE

## 2022-01-18 RX ORDER — TIZANIDINE 2 MG/1
2 TABLET ORAL 3 TIMES DAILY PRN
Qty: 30 TABLET | Refills: 1 | Status: SHIPPED
Start: 2022-01-18 | End: 2022-02-21 | Stop reason: SDUPTHER

## 2022-01-18 ASSESSMENT — ENCOUNTER SYMPTOMS
EYE DISCHARGE: 0
EYE PAIN: 0
STRIDOR: 0
NAUSEA: 0
SINUS PAIN: 0
ANAL BLEEDING: 0
DIARRHEA: 0
SHORTNESS OF BREATH: 0
ABDOMINAL PAIN: 0
WHEEZING: 0
EYE ITCHING: 0
CONSTIPATION: 0
BLOOD IN STOOL: 0
SORE THROAT: 1
TROUBLE SWALLOWING: 0
RHINORRHEA: 0
SINUS PRESSURE: 0
PHOTOPHOBIA: 0
FACIAL SWELLING: 0
VOMITING: 0
COLOR CHANGE: 0
COUGH: 1

## 2022-01-18 ASSESSMENT — PATIENT HEALTH QUESTIONNAIRE - PHQ9
2. FEELING DOWN, DEPRESSED OR HOPELESS: 0
SUM OF ALL RESPONSES TO PHQ QUESTIONS 1-9: 0
1. LITTLE INTEREST OR PLEASURE IN DOING THINGS: 0
SUM OF ALL RESPONSES TO PHQ QUESTIONS 1-9: 0
SUM OF ALL RESPONSES TO PHQ9 QUESTIONS 1 & 2: 0

## 2022-01-18 NOTE — PROGRESS NOTES
2022    Name: Carla Betts : 1965 Sex: female  Age: 64 y.o. Subjective:  Chief Complaint   Patient presents with    Gastroesophageal Reflux     3 month visit        Cough  Associated symptoms include ear pain, headaches, myalgias and a sore throat. Pertinent negatives include no chest pain, chills, fever, rash, rhinorrhea, shortness of breath or wheezing. There is no history of environmental allergies. Otalgia   Associated symptoms include coughing, headaches and a sore throat. Pertinent negatives include no abdominal pain, diarrhea, hearing loss, rash, rhinorrhea or vomiting. Patient tested positive for COVID-19 last Monday. She received monoclonal antibodies the next day. She did not have any significant improvement in her upper respiratory symptoms. She she continued with severe sore throat, nonproductive cough, generalized malaise and fatigue. She has severe headaches for which she takes Excedrin. She is aware that Excedrin has aspirin in it and she has problems with her stomach. She is very careful with that use. She also takes an occasional ibuprofen when she has myalgias and arthralgias. I gave her a prescription for tizanidine which helps with this as well. Last blood work reviewed. Her hemoglobin A1c despite her nausea, vomiting and relatively dehydrated state did not improve significantly. It is 5.6% with result a week ago 5.3%. Serum ferritin level is 15 compared to 13.9 on 3 January 2022. Tobi Oliveiram Patient has a history of recurrent upper respiratory infections for years which have been getting worse. .  She had her blood work done today and her hemoglobin was down to 5.1. Hematocrit of 18. White blood cell count of 2016 with increased segs. BMP showed chloride of 108, kidney function normal rest of lites normal nonfasting blood sugar of 103, sed rate was normal.     Unfortunately she has a history of immunodeficiency with hypogammaglobulinemia.   This is one of the main reasons why she keeps getting infections. She had one dose of IVIG. Insurance refused to pay for other doses. She has a history of recurrent bile duct stenosis, multiple gastric ulcers and GERD without esophagitis. She has been unable to get an EGD because of the COVID-19 situation. Every time she is about to be scheduled for one  she comes down with an upper respiratory infection and is unable to get it done. She was initially under the care of a gastroenterologist in [facility]. He placed bile duct stents. .  This worked for a little bit but then she would have recurrent infections and they would have to come out. She has been on famotidine and pantoprazole for a long time for her gastric ulcers and GERD. She has recurrent abdominal pain especially in the epigastric area. She has recurrent nausea and vomiting. This makes her dehydrated and she has to give herself IV saline. She also uses IV Zofran for her severe nausea. She contacted her [facility] clinic doctor who wants to get her stomach symptoms to calm down before he will attempt to do an EGD on her. She has recurrent vomitings most times undigested food. I wonder if she has an element of gastroparesis. I talked her about getting a gastric emptying time but she is reluctant to do it. I really think she needs to see her GI doctor for an EGD and ERCP. She has a history of chronic iron deficiency anemia. Her hemoglobin and hematocrit have significantly declined over the last year. .  She gets IV iron once a week from her Hematologist Dr. Roselyn Hernandez. Despite this her serum iron Is only 8 with iron saturation of 2. IV iron is giving her a lot of problems with swelling of her hands and pain. She was found to be deficient in vitamin B12 and has been started on monthly IM B12 shots 1000 mcg. Despite this her B12 level is still only 334. Hematologist has mentioned a repeat bone marrow biopsy but this has not been scheduled.     She has a history along with her hypogammaglobulinemia of neutropenia last WBC count was 3800. The only thing she has not had is thrombocytopenia. Her immune system is very deficient and she obviously gets recurrent infections from this. Years ago she had numerous surgeries on her right knee. She was up at [facility] for most of these. She had to wear a brace for a long time but that was causing a lot of pinching and bruising. She has easy bruisability. She no longer wears a brace but her right knee is unstable and can buckle under any time. Her orthopedic surgeon at [facility]. at one time wondered if she had an autoimmune disorder that was causing her to reject all the things that he was doing to her knee. Because of all of this she is severely fatigued. She is very weak. It is very difficult for her to stand for over 5 minutes. If she walks she gets fatigued and has to rest after even after  20 feet. She is able to sit for a while but she has to get up and move around. She has a hard time with her hands as her  is weak. 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 She was having recurrent palpitations. She still has the palpitations but we have not had an EKG on her in a while. And I am not sure where the diagnostic EKG's are at this time. Her port was  replaced by Dr. Kristine Key about 8 months ago. She has the port because of her weekly  IV iron  and because when she gets dehydrated she has to give herself extra saline IV. She usually gives herself  One thousand cc of Normal Saline at [facility]. She also had premature ovarian failure diagnosed by Dr. Leanord Lennox. With her immunoglobulin deficiency, biliary stenosis, recurrent URIs, and her other problems, I wonder if she does indeed have some type of autoimmune disease causing connective tissue disorder.   Have recommended that she see a rheumatologist and  an immunologist.  She will check which facility she is allowed to go to in her insurance handbook. Review of Systems   Constitutional: Positive for fatigue. Negative for appetite change, chills, fever and unexpected weight change. HENT: Positive for ear pain and sore throat. Negative for congestion, facial swelling, hearing loss, rhinorrhea, sinus pressure, sinus pain, tinnitus and trouble swallowing. Eyes: Negative for photophobia, pain, discharge, itching and visual disturbance. Respiratory: Positive for cough. Negative for shortness of breath, wheezing and stridor. Cardiovascular: Negative for chest pain, palpitations and leg swelling. Gastrointestinal: Negative for abdominal pain, anal bleeding, blood in stool, constipation, diarrhea, nausea and vomiting. Endocrine: Negative for cold intolerance, heat intolerance, polydipsia, polyphagia and polyuria. Genitourinary: Negative for difficulty urinating, dysuria, flank pain, frequency, hematuria and urgency. Musculoskeletal: Positive for arthralgias, gait problem and myalgias. Negative for joint swelling. Cervicalgia   Skin: Negative for color change, pallor and rash. Allergic/Immunologic: Negative for environmental allergies and food allergies. Neurological: Positive for weakness, light-headedness and headaches. Negative for dizziness, tremors, seizures, syncope, speech difficulty and numbness. Hematological: Negative for adenopathy. Bruises/bleeds easily. Psychiatric/Behavioral: Negative for agitation, behavioral problems, confusion, sleep disturbance and suicidal ideas. The patient is not nervous/anxious.            Current Outpatient Medications:     tiZANidine (ZANAFLEX) 2 MG tablet, Take 1 tablet by mouth 3 times daily as needed (cervicalgia), Disp: 30 tablet, Rfl: 1    pantoprazole (PROTONIX) 40 MG tablet, Take 1 tablet by mouth every morning (before breakfast), Disp: 30 tablet, Rfl: 5    cyanocobalamin 1000 MCG/ML injection, MONTHLY, Disp: , Rfl:     ferric gluconate (FERRLECIT) 12.5 MG/ML injection, Ferric Sodium Gluconate Comple (Ferrlecit 62.5 Mg/5 Ml Vial) 62.5 MG/5 ML Vial Active 125 MG IV Weekly April 6th, 2021 3:13pm mondays, Disp: , Rfl:     Ferrous Sulfate (IRON PO), Infuse intravenously, Disp: , Rfl:     ondansetron (ZOFRAN) 4 MG tablet, Ondansetron Ondansetron Hcl Active 4 MG Oral Every 6 hours as needed June 7th, 2017 2:47pm 06-  [facility] ([id number]), Disp: , Rfl:     famotidine (PEPCID) 10 MG tablet, Famotidine Famotidine Active 10 MG Oral Daily June 7th, 2017 2:46pm 06-  [facility] ([id number]), Disp: , Rfl:     MULTIPLE VITAMIN IV, Infuse intravenously, Disp: , Rfl:     ferrous sulfate (ALEXANDRA-IN-SOL) 75 (15 Fe) MG/ML solution, Take 15 mg by mouth daily Indications: Iron Infusions, weekly (solu-medrol before infusions.) , Disp: , Rfl:      Allergies   Allergen Reactions    Cefepime      Other reaction(s): blood pressure changes    Morphine      rash(morphine)  Other reaction(s): emesis, blood pressure changes ,rash    Vancomycin Rash     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    Cephalosporins Other (See Comments)        Past Medical History:   Diagnosis Date    Abdominal pain, epigastric 6/11/2004    Acute bacterial conjunctivitis of both eyes 4/27/2020    Chronic fatigue 7/10/2019    Herpes zoster without complication 9/33/6241    Hypokalemia 4/13/2020    Immunodeficiency ([facility] [address] 7/10/2019    Knee arthropathy 7/10/2019    Migraine without aura and without status migrainosus, not intractable 7/10/2019       Health Maintenance Due   Topic Date Due    Hepatitis C screen  Never done    Pneumococcal 0-64 years Vaccine (1 of 4 - PCV13) Never done    HIV screen  Never done    DTaP/Tdap/Td vaccine (1 - Tdap) Never done    Cervical cancer screen  Never done    Colon cancer screen colonoscopy  Never done        Patient Active Problem List   Diagnosis    Abdominal pain, epigastric    Chronic fatigue    Immunodeficiency ([address]    Migraine without aura and without status migrainosus, not intractable    Knee arthropathy    Gastroesophageal reflux disease without esophagitis    Bile duct stenosis    Multiple gastric ulcers    Neutropenia (HCC)    Bronchitis    Iron deficiency anemia due to chronic blood loss    Acute pansinusitis    Hypoglobulinemia    Chronic frontal sinusitis    Vitamin B12 deficiency    Immunocompromised state ([address]    Cervicalgia    New daily persistent headache    Leukopenia    Back muscle spasm    COVID-19    Encounter for screening mammogram for breast cancer        Past Surgical History:   Procedure Laterality Date    CHOLECYSTECTOMY, OPEN      HIP SURGERY Right     KNEE SURGERY Right     SKIN GRAFT      TONSILLECTOMY      TUNNELED CENTRAL VENOUS CATHETER W/ SUBCUTANEOUS PORT      several times        Family History   Problem Relation Age of Onset    Dementia Mother     Heart Disease Father     Migraines Sister         Social History     Tobacco Use    Smoking status: Never Smoker    Smokeless tobacco: Never Used   Substance Use Topics    Alcohol use: Never    Drug use: Never        Objective  Vitals:    01/18/22 0909   BP: 134/68   Pulse: 105   Temp: 98.4 °F (36.9 °C)   SpO2: 95%   Weight: 121 lb (54.9 kg)   Height: 5' 5\" (1.651 m)        Exam:  Physical Exam  Vitals reviewed. Constitutional:       General: She is not in acute distress. Appearance: She is well-developed and normal weight. She is not ill-appearing. HENT:      Head: Normocephalic and atraumatic. Right Ear: Tympanic membrane, ear canal and external ear normal.      Left Ear: Tympanic membrane, ear canal and external ear normal.      Nose: Nose normal.      Mouth/Throat:      Mouth: Mucous membranes are moist.      Pharynx: Oropharynx is clear. No oropharyngeal exudate or posterior oropharyngeal erythema. Eyes:      General: No scleral icterus. Right eye: No discharge. Left eye: No discharge. Comments: Pale conjunctiva   Neck:      Thyroid: No thyromegaly. Cardiovascular:      Rate and Rhythm: Normal rate and regular rhythm. Heart sounds: Normal heart sounds. No murmur heard. No friction rub. No gallop. Pulmonary:      Effort: Pulmonary effort is normal. No respiratory distress. Breath sounds: Normal breath sounds. No wheezing, rhonchi or rales. Chest:      Chest wall: No tenderness. Musculoskeletal:         General: Deformity present. No tenderness. Cervical back: Normal range of motion and neck supple. Comments: Deformity right knee with decreased range of motion to flexion       Lymphadenopathy:      Cervical: No cervical adenopathy. Skin:     General: Skin is warm and dry. Coloration: Skin is not pale. Findings: No bruising, erythema or rash. Neurological:      General: No focal deficit present. Mental Status: She is alert and oriented to person, place, and time. Cranial Nerves: No cranial nerve deficit. Sensory: No sensory deficit. Deep Tendon Reflexes: Reflexes normal.   Psychiatric:         Mood and Affect: Mood normal.         Behavior: Behavior normal.         Thought Content: Thought content normal.         Judgment: Judgment normal.          Last labs reviewed. ASSESSMENT & PLAN :   Problem List        Digestive    Gastroesophageal reflux disease without esophagitis       continue famotidine and pantoprazole. I would like to get an upper GI on her but she is unable to do so because of her recurrent upper respiratory infections. She has not had an EGD in a while as she continues to have problems with infections and is unable to see her gastroenterologist         Relevant Medications    ondansetron (ZOFRAN) 4 MG tablet    famotidine (PEPCID) 10 MG tablet    pantoprazole (PROTONIX) 40 MG tablet    Multiple gastric ulcers       continue PPI.   She really does need an EGD but I do not know when she is going to be able to do this in view of her recurrent medical problems         Relevant Medications    pantoprazole (PROTONIX) 40 MG tablet       Other    Neutropenia (HCC)       hematology to monitor         Iron deficiency anemia due to chronic blood loss       severe and persistent. Hematology monitoring. IV iron per hematology         Hypoglobulinemia       follow-up with hematologist.         Immunocompromised state [facility])       she is very careful about exposure. She wears her mask and tries to stay away from people in a crowded room. Unfortunately despite this she got COVID-19 infection         Cervicalgia       tizanidine as directed, prescription given         Relevant Medications    tiZANidine (ZANAFLEX) 2 MG tablet    COVID-19 - Primary       hydrate, take Excedrin judiciously for headaches and Advil judiciously for myalgias and arthralgias. Tylenol does not work. Watch for GI upset         Encounter for screening mammogram for breast cancer       requisition for screening mammogram given         Relevant Orders    DWAIN DIGITAL SCREEN W OR WO CAD BILATERAL           Return in about 3 months (around 4/18/2022).        Argelia Brian, DO  1/18/2022

## 2022-01-18 NOTE — ASSESSMENT & PLAN NOTE
continue famotidine and pantoprazole. I would like to get an upper GI on her but she is unable to do so because of her recurrent upper respiratory infections.   She has not had an EGD in a while as she continues to have problems with infections and is unable to see her gastroenterologist

## 2022-01-18 NOTE — ASSESSMENT & PLAN NOTE
hydrate, take Excedrin judiciously for headaches and Advil judiciously for myalgias and arthralgias. Tylenol does not work.   Watch for GI upset

## 2022-01-18 NOTE — ASSESSMENT & PLAN NOTE
continue PPI.   She really does need an EGD but I do not know when she is going to be able to do this in view of her recurrent medical problems

## 2022-01-18 NOTE — ASSESSMENT & PLAN NOTE
she is very careful about exposure. She wears her mask and tries to stay away from people in a crowded room.   Unfortunately despite this she got COVID-19 infection

## 2022-02-17 PROBLEM — Z12.31 ENCOUNTER FOR SCREENING MAMMOGRAM FOR BREAST CANCER: Status: RESOLVED | Noted: 2022-01-18 | Resolved: 2022-02-17

## 2022-02-21 DIAGNOSIS — M54.2 CERVICALGIA: ICD-10-CM

## 2022-02-21 RX ORDER — TIZANIDINE 2 MG/1
2 TABLET ORAL 3 TIMES DAILY PRN
Qty: 60 TABLET | Refills: 1 | Status: SHIPPED
Start: 2022-02-21 | End: 2022-04-20 | Stop reason: SDUPTHER

## 2022-04-20 ENCOUNTER — OFFICE VISIT (OUTPATIENT)
Dept: PRIMARY CARE CLINIC | Age: 57
End: 2022-04-20
Payer: COMMERCIAL

## 2022-04-20 VITALS
TEMPERATURE: 96.8 F | WEIGHT: 120 LBS | SYSTOLIC BLOOD PRESSURE: 120 MMHG | BODY MASS INDEX: 19.99 KG/M2 | DIASTOLIC BLOOD PRESSURE: 64 MMHG | OXYGEN SATURATION: 96 % | HEART RATE: 98 BPM | HEIGHT: 65 IN

## 2022-04-20 DIAGNOSIS — K21.9 GASTROESOPHAGEAL REFLUX DISEASE WITHOUT ESOPHAGITIS: ICD-10-CM

## 2022-04-20 DIAGNOSIS — Z23 NEED FOR TETANUS, DIPHTHERIA, AND ACELLULAR PERTUSSIS (TDAP) VACCINE: ICD-10-CM

## 2022-04-20 DIAGNOSIS — D84.9 IMMUNODEFICIENCY (HCC): ICD-10-CM

## 2022-04-20 DIAGNOSIS — D70.8 OTHER NEUTROPENIA (HCC): ICD-10-CM

## 2022-04-20 DIAGNOSIS — K83.1 BILE DUCT STENOSIS: ICD-10-CM

## 2022-04-20 DIAGNOSIS — M54.2 CERVICALGIA: Primary | ICD-10-CM

## 2022-04-20 DIAGNOSIS — R77.1 HYPOGLOBULINEMIA: ICD-10-CM

## 2022-04-20 PROCEDURE — G8427 DOCREV CUR MEDS BY ELIG CLIN: HCPCS | Performed by: INTERNAL MEDICINE

## 2022-04-20 PROCEDURE — 1036F TOBACCO NON-USER: CPT | Performed by: INTERNAL MEDICINE

## 2022-04-20 PROCEDURE — G8420 CALC BMI NORM PARAMETERS: HCPCS | Performed by: INTERNAL MEDICINE

## 2022-04-20 PROCEDURE — 99213 OFFICE O/P EST LOW 20 MIN: CPT | Performed by: INTERNAL MEDICINE

## 2022-04-20 PROCEDURE — 3017F COLORECTAL CA SCREEN DOC REV: CPT | Performed by: INTERNAL MEDICINE

## 2022-04-20 RX ORDER — TIZANIDINE 2 MG/1
2 TABLET ORAL 3 TIMES DAILY PRN
Qty: 60 TABLET | Refills: 1 | Status: SHIPPED
Start: 2022-04-20 | End: 2022-06-20 | Stop reason: SDUPTHER

## 2022-04-20 SDOH — ECONOMIC STABILITY: FOOD INSECURITY: WITHIN THE PAST 12 MONTHS, YOU WORRIED THAT YOUR FOOD WOULD RUN OUT BEFORE YOU GOT MONEY TO BUY MORE.: NEVER TRUE

## 2022-04-20 SDOH — ECONOMIC STABILITY: FOOD INSECURITY: WITHIN THE PAST 12 MONTHS, THE FOOD YOU BOUGHT JUST DIDN'T LAST AND YOU DIDN'T HAVE MONEY TO GET MORE.: NEVER TRUE

## 2022-04-20 ASSESSMENT — ENCOUNTER SYMPTOMS
EYE ITCHING: 0
COLOR CHANGE: 0
TROUBLE SWALLOWING: 0
NAUSEA: 0
ANAL BLEEDING: 0
EYE PAIN: 0
CONSTIPATION: 0
SHORTNESS OF BREATH: 0
STRIDOR: 0
BLOOD IN STOOL: 0
WHEEZING: 0
RHINORRHEA: 0
ABDOMINAL PAIN: 0
PHOTOPHOBIA: 0
COUGH: 1
SINUS PRESSURE: 0
SORE THROAT: 1
SINUS PAIN: 0
FACIAL SWELLING: 0
DIARRHEA: 0
EYE DISCHARGE: 0
VOMITING: 0

## 2022-04-20 ASSESSMENT — SOCIAL DETERMINANTS OF HEALTH (SDOH): HOW HARD IS IT FOR YOU TO PAY FOR THE VERY BASICS LIKE FOOD, HOUSING, MEDICAL CARE, AND HEATING?: NOT VERY HARD

## 2022-04-20 NOTE — PROGRESS NOTES
2022    Name: Helder Walker : 1965 Sex: female  Age: 62 y.o. Subjective:  Chief Complaint   Patient presents with    3 Month Follow-Up        Cough  Associated symptoms include ear pain, headaches, myalgias and a sore throat. Pertinent negatives include no chest pain, chills, fever, rash, rhinorrhea, shortness of breath or wheezing. There is no history of environmental allergies. Otalgia   Associated symptoms include coughing, headaches and a sore throat. Pertinent negatives include no abdominal pain, diarrhea, hearing loss, rash, rhinorrhea or vomiting. Patient still has decreased sense of smell from her COVID infection. Last blood work reviewed. On 2022 hemoglobin was 5 and hematocrit of 17.8. This is a decrease from a week prior to this where her hemoglobin was 5.5 and hematocrit was 19.9.. Serum ferritin level is 14.9, total iron binding capacity 434, serum iron is 11 in 2022 . She will be following up with her hematologist in the near future. .  Patient has a history of recurrent upper respiratory infections for years which have been getting worse. Zuleika Hunt Unfortunately she has a history of immunodeficiency with hypogammaglobulinemia. This is one of the main reasons why she keeps getting infections. She had one dose of IVIG. Insurance refused to pay for other doses. She has a history of recurrent bile duct stenosis, multiple gastric ulcers and GERD without esophagitis. She has been unable to get an EGD because of the COVID-19 situation. Every time she is about to be scheduled for one  she comes down with an upper respiratory infection and is unable to get it done. She was initially under the care of a gastroenterologist in [facility]. He placed bile duct stents. .  This worked for a little bit but then she would have recurrent infections and they would have to come out.   She has been on famotidine and pantoprazole for a long time for her gastric ulcers and GERD. She has recurrent abdominal pain especially in the epigastric area. She has recurrent nausea and vomiting. This makes her dehydrated and she has to give herself IV saline. She also uses IV Zofran for her severe nausea. She contacted her [facility] doctor who wants to get her stomach symptoms to calm down before he will attempt to do an EGD on her. She has recurrent vomitings most times undigested food. I wonder if she has an element of gastroparesis. I talked her about getting a gastric emptying time but she is reluctant to do it. I really think she needs to see her GI doctor for an EGD and ERCP. She has a history of chronic iron deficiency anemia. Her hemoglobin and hematocrit have significantly declined over the last year. .  She gets IV iron once a week from her Hematologist Dr. Renan Eddy. . IV iron is giving her a lot of problems with swelling of her hands and pain. She was found to be deficient in vitamin B12 and has been started on monthly IM B12 shots 1000 mcg. Despite this her B12 level is still only 334. Hematologist has mentioned a repeat bone marrow biopsy but this has not been scheduled. She has a history along with her hypogammaglobulinemia of neutropenia last WBC count was 3800. The only thing she has not had is thrombocytopenia. Her immune system is very deficient and she obviously gets recurrent infections from this. Years ago she had numerous surgeries on her right knee. She was up at [facility] for most of these. She had to wear a brace for a long time but that was causing a lot of pinching and bruising. She has easy bruisability. She no longer wears a brace but her right knee is unstable and can buckle under any time. Her orthopedic surgeon at [place] at one time wondered if she had an autoimmune disorder that was causing her to reject all the things that he was doing to her knee. Because of all of this she is severely fatigued.   She is very weak.  It is very difficult for her to stand for over 5 minutes. If she walks she gets fatigued and has to rest after even after  20 feet. She is able to sit for a while but she has to get up and move around. She has a hard time with her hands as her  is weak. 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 She was having recurrent palpitations. She still has the palpitations. I found an old EKG which showed she had a short UT interval but no delta waves consistent with WPW syndrome. Her port was  replaced by Dr. Suad Garcia about 8 months ago. She has the port because of her weekly  IV iron  and because when she gets dehydrated she has to give herself extra saline IV. She usually gives herself  One thousand cc of Normal Saline at night. She also had premature ovarian failure diagnosed by Dr. Pepe Wolfe. With her immunoglobulin deficiency, biliary stenosis, recurrent URIs, and her other problems, I wonder if she does indeed have some type of autoimmune disease causing connective tissue disorder. Have recommended that she see a rheumatologist and  an immunologist.  She will check which facility she is allowed to go to in her insurance handbook. Review of Systems   Constitutional: Positive for fatigue. Negative for appetite change, chills, fever and unexpected weight change. HENT: Positive for ear pain and sore throat. Negative for congestion, facial swelling, hearing loss, rhinorrhea, sinus pressure, sinus pain, tinnitus and trouble swallowing. Eyes: Negative for photophobia, pain, discharge, itching and visual disturbance. Respiratory: Positive for cough. Negative for shortness of breath, wheezing and stridor. Cardiovascular: Negative for chest pain, palpitations and leg swelling. Gastrointestinal: Negative for abdominal pain, anal bleeding, blood in stool, constipation, diarrhea, nausea and vomiting.    Endocrine: Negative for cold intolerance, heat intolerance, polydipsia, polyphagia and polyuria. Genitourinary: Negative for difficulty urinating, dysuria, flank pain, frequency, hematuria and urgency. Musculoskeletal: Positive for arthralgias, gait problem and myalgias. Negative for joint swelling. Cervicalgia   Skin: Negative for color change, pallor and rash. Allergic/Immunologic: Negative for environmental allergies and food allergies. Neurological: Positive for weakness, light-headedness and headaches. Negative for dizziness, tremors, seizures, syncope, speech difficulty and numbness. Hematological: Negative for adenopathy. Bruises/bleeds easily. Psychiatric/Behavioral: Negative for agitation, behavioral problems, confusion, sleep disturbance and suicidal ideas. The patient is not nervous/anxious.            Current Outpatient Medications:     tiZANidine (ZANAFLEX) 2 MG tablet, Take 1 tablet by mouth 3 times daily as needed (cervicalgia), Disp: 60 tablet, Rfl: 1    Tetanus-Diphth-Acell Pertussis (BOOSTRIX) 5-2.5-18.5 LF-MCG/0.5 injection, Inject 0.5 mLs into the muscle once for 1 dose, Disp: 1 each, Rfl: 0    pantoprazole (PROTONIX) 40 MG tablet, Take 1 tablet by mouth every morning (before breakfast), Disp: 30 tablet, Rfl: 5    cyanocobalamin 1000 MCG/ML injection, MONTHLY, Disp: , Rfl:     ferric gluconate (FERRLECIT) 12.5 MG/ML injection, Ferric Sodium Gluconate Comple (Ferrlecit 62.5 Mg/5 Ml Vial) 62.5 MG/5 ML Vial Active 125 MG IV Weekly April 6th, 2021 3:13pm mondays, Disp: , Rfl:     Ferrous Sulfate (IRON PO), Infuse intravenously, Disp: , Rfl:     ondansetron (ZOFRAN) 4 MG tablet, Ondansetron Ondansetron Hcl Active 4 MG Oral Every 6 hours as needed June 7th, 2017 2:47pm 06-  [facility] ([id number]), Disp: , Rfl:     famotidine (PEPCID) 10 MG tablet, Famotidine Famotidine Active 10 MG Oral Daily June 7th, 2017 2:46pm 06-  [facility] ([id number]), Disp: , Rfl:     MULTIPLE VITAMIN IV, Infuse intravenously, Disp: , Rfl:     ferrous sulfate (ALEXANDRA-IN-SOL) 75 (15 Fe) MG/ML solution, Take 15 mg by mouth daily Indications: Iron Infusions, weekly (solu-medrol before infusions.) , Disp: , Rfl:      Allergies   Allergen Reactions    Cefepime      Other reaction(s): blood pressure changes    Morphine      rash(morphine)  Other reaction(s): emesis, blood pressure changes ,rash    Vancomycin Rash     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    Cephalosporins Other (See Comments)        Past Medical History:   Diagnosis Date    Abdominal pain, epigastric 6/11/2004    Acute bacterial conjunctivitis of both eyes 4/27/2020    Chronic fatigue 7/10/2019    Herpes zoster without complication 6/25/5341    Hypokalemia 4/13/2020    Immunodeficiency ([address] 7/10/2019    Knee arthropathy 7/10/2019    Migraine without aura and without status migrainosus, not intractable 7/10/2019       Health Maintenance Due   Topic Date Due    Pneumococcal 0-64 years Vaccine (1 - PCV) Never done    HIV screen  Never done    Hepatitis C screen  Never done    DTaP/Tdap/Td vaccine (1 - Tdap) Never done    Cervical cancer screen  Never done    Colorectal Cancer Screen  Never done    COVID-19 Vaccine (4 - Booster for Moderna series) 02/12/2022        Patient Active Problem List   Diagnosis    Abdominal pain, epigastric    Chronic fatigue    Immunodeficiency ([address]    Migraine without aura and without status migrainosus, not intractable    Knee arthropathy    Gastroesophageal reflux disease without esophagitis    Bile duct stenosis    Multiple gastric ulcers    Neutropenia (HCC)    Bronchitis    Iron deficiency anemia due to chronic blood loss    Acute pansinusitis    Hypoglobulinemia    Chronic frontal sinusitis    Vitamin B12 deficiency    Immunocompromised state (HCC)    Cervicalgia    New daily persistent headache    Leukopenia    Back muscle spasm    COVID-19    Need for tetanus, diphtheria, and acellular pertussis (Tdap) vaccine        Past Surgical History:   Procedure Laterality Date    CHOLECYSTECTOMY, OPEN      HIP SURGERY Right     KNEE SURGERY Right     SKIN GRAFT      TONSILLECTOMY      TUNNELED CENTRAL VENOUS CATHETER W/ SUBCUTANEOUS PORT      several times        Family History   Problem Relation Age of Onset    Dementia Mother     Heart Disease Father     Migraines Sister         Social History     Tobacco Use    Smoking status: Never Smoker    Smokeless tobacco: Never Used   Substance Use Topics    Alcohol use: Never    Drug use: Never        Objective  Vitals:    04/20/22 0905   BP: 120/64   Pulse: 98   Temp: 96.8 °F (36 °C)   SpO2: 96%   Weight: 120 lb (54.4 kg)   Height: 5' 5\" (1.651 m)        Exam:  Physical Exam  Vitals reviewed. Constitutional:       General: She is not in acute distress. Appearance: She is well-developed and normal weight. She is not ill-appearing. HENT:      Head: Normocephalic and atraumatic. Right Ear: Tympanic membrane, ear canal and external ear normal.      Left Ear: Tympanic membrane, ear canal and external ear normal.      Nose: Nose normal.      Mouth/Throat:      Mouth: Mucous membranes are moist.      Pharynx: Oropharynx is clear. No oropharyngeal exudate or posterior oropharyngeal erythema. Eyes:      General: No scleral icterus. Right eye: No discharge. Left eye: No discharge. Comments: Pale conjunctiva   Neck:      Thyroid: No thyromegaly. Cardiovascular:      Rate and Rhythm: Normal rate and regular rhythm. Heart sounds: Normal heart sounds. No murmur heard. No friction rub. No gallop. Pulmonary:      Effort: Pulmonary effort is normal. No respiratory distress. Breath sounds: Normal breath sounds. No wheezing, rhonchi or rales. Chest:      Chest wall: No tenderness. Musculoskeletal:         General: Deformity present. No tenderness. Cervical back: Normal range of motion and neck supple.       Comments: Deformity right knee with decreased range of motion to flexion       Lymphadenopathy:      Cervical: No cervical adenopathy. Skin:     General: Skin is warm and dry. Coloration: Skin is not pale. Findings: No bruising, erythema or rash. Neurological:      General: No focal deficit present. Mental Status: She is alert and oriented to person, place, and time. Cranial Nerves: No cranial nerve deficit. Sensory: No sensory deficit. Deep Tendon Reflexes: Reflexes normal.   Psychiatric:         Mood and Affect: Mood normal.         Behavior: Behavior normal.         Thought Content: Thought content normal.         Judgment: Judgment normal.          Last labs reviewed. ASSESSMENT & PLAN :   Problem List        Digestive    Gastroesophageal reflux disease without esophagitis       unable to get an EGD or ERCP for many reasons. She would like to see Dr. Rain Oleary in [place] for an EGD when her insurance changes next year. She can still see her [facility] Cierra Pagan for her ERCP continue PPI and H2 blocker         Relevant Medications    ondansetron (ZOFRAN) 4 MG tablet    famotidine (PEPCID) 10 MG tablet    pantoprazole (PROTONIX) 40 MG tablet    Bile duct stenosis       Other    Need for tetanus, diphtheria, and acellular pertussis (Tdap) vaccine       Tdap requisition sent to her pharmacy         Relevant Medications    Tetanus-Diphth-Acell Pertussis ([address]) 5-2.5-18.5 LF-MCG/0.5 injection    Immunodeficiency [facility])        hematologist monitors immunoglobulins         Neutropenia (HCC)       monitor WBC and differential         Hypoglobulinemia    Cervicalgia - Primary       tizanidine as needed         Relevant Medications    tiZANidine (ZANAFLEX) 2 MG tablet           Return in about 3 months (around 7/20/2022), or gerd, anemia.         Barbra Cortez,   4/20/2022

## 2022-04-20 NOTE — ASSESSMENT & PLAN NOTE
unable to get an EGD or ERCP for many reasons. She would like to see Dr. Yumiko White in [place] for an EGD when her insurance changes next year.   She can still see her [facility] Rosemary Garza for her ERCP continue PPI and H2 blocker

## 2022-06-20 DIAGNOSIS — M54.2 CERVICALGIA: ICD-10-CM

## 2022-06-20 DIAGNOSIS — R11.2 INTRACTABLE VOMITING WITH NAUSEA, UNSPECIFIED VOMITING TYPE: Primary | ICD-10-CM

## 2022-06-20 RX ORDER — ONDANSETRON 4 MG/1
4 TABLET, ORALLY DISINTEGRATING ORAL 3 TIMES DAILY PRN
Qty: 21 TABLET | Refills: 2 | Status: SHIPPED | OUTPATIENT
Start: 2022-06-20

## 2022-06-20 RX ORDER — TIZANIDINE 2 MG/1
2 TABLET ORAL 3 TIMES DAILY PRN
Qty: 60 TABLET | Refills: 2 | Status: SHIPPED
Start: 2022-06-20 | End: 2022-08-31 | Stop reason: SDUPTHER

## 2022-07-20 ENCOUNTER — OFFICE VISIT (OUTPATIENT)
Dept: PRIMARY CARE CLINIC | Age: 57
End: 2022-07-20
Payer: MEDICARE

## 2022-07-20 VITALS
DIASTOLIC BLOOD PRESSURE: 68 MMHG | HEIGHT: 65 IN | WEIGHT: 118 LBS | BODY MASS INDEX: 19.66 KG/M2 | HEART RATE: 89 BPM | OXYGEN SATURATION: 96 % | SYSTOLIC BLOOD PRESSURE: 124 MMHG | TEMPERATURE: 98 F

## 2022-07-20 DIAGNOSIS — Z23 NEED FOR VACCINATION AGAINST STREPTOCOCCUS PNEUMONIAE: ICD-10-CM

## 2022-07-20 DIAGNOSIS — D50.0 IRON DEFICIENCY ANEMIA DUE TO CHRONIC BLOOD LOSS: ICD-10-CM

## 2022-07-20 DIAGNOSIS — K25.9 MULTIPLE GASTRIC ULCERS: ICD-10-CM

## 2022-07-20 DIAGNOSIS — K21.9 GASTROESOPHAGEAL REFLUX DISEASE WITHOUT ESOPHAGITIS: Primary | ICD-10-CM

## 2022-07-20 DIAGNOSIS — R53.82 CHRONIC FATIGUE: ICD-10-CM

## 2022-07-20 DIAGNOSIS — D84.9 IMMUNODEFICIENCY (HCC): ICD-10-CM

## 2022-07-20 PROCEDURE — 90677 PCV20 VACCINE IM: CPT | Performed by: INTERNAL MEDICINE

## 2022-07-20 PROCEDURE — 99213 OFFICE O/P EST LOW 20 MIN: CPT | Performed by: INTERNAL MEDICINE

## 2022-07-20 RX ORDER — FAMOTIDINE 20 MG/1
20 TABLET, FILM COATED ORAL 2 TIMES DAILY
Qty: 60 TABLET | Refills: 3 | Status: SHIPPED
Start: 2022-07-20 | End: 2022-08-31 | Stop reason: ALTCHOICE

## 2022-07-20 ASSESSMENT — ENCOUNTER SYMPTOMS
SINUS PRESSURE: 0
COUGH: 1
BLOOD IN STOOL: 0
STRIDOR: 0
COLOR CHANGE: 0
ABDOMINAL PAIN: 0
WHEEZING: 0
TROUBLE SWALLOWING: 0
SORE THROAT: 0
VOMITING: 0
CONSTIPATION: 0
ANAL BLEEDING: 0
RHINORRHEA: 0
EYE ITCHING: 0
EYE DISCHARGE: 0
SHORTNESS OF BREATH: 0
FACIAL SWELLING: 0
EYE PAIN: 0
NAUSEA: 0
DIARRHEA: 0
SINUS PAIN: 0
PHOTOPHOBIA: 0

## 2022-07-20 NOTE — ASSESSMENT & PLAN NOTE
continue IV iron and as needed transfusions as per hematology.   Await recommendations  from GI once they have seen her

## 2022-07-20 NOTE — PROGRESS NOTES
2022    Name: Florentino Burks : 1965 Sex: female  Age: 62 y.o. Subjective:  No chief complaint on file. Cough  Associated symptoms include headaches and myalgias. Pertinent negatives include no chest pain, chills, ear pain, fever, rash, rhinorrhea, sore throat, shortness of breath or wheezing. There is no history of environmental allergies. Otalgia   Associated symptoms include coughing and headaches. Pertinent negatives include no abdominal pain, diarrhea, hearing loss, rash, rhinorrhea, sore throat or vomiting. Patient still has decreased sense of smell from her COVID infection. Last blood work reviewed. On 2022 hemoglobin was 5 and hematocrit of 17.8. This is a decrease from a week prior to this where her hemoglobin was 5.5 and hematocrit was 19.9.. Serum ferritin level is 14.9, total iron binding capacity 434, serum iron is 11 in 2022 . She will be following up with her hematologist in the near future. .  Patient has a history of recurrent upper respiratory infections for years which have been getting worse. Henna Andrea Unfortunately she has a history of immunodeficiency with hypogammaglobulinemia. This is one of the main reasons why she keeps getting infections. She had one dose of IVIG. Insurance refused to pay for other doses. She has a history of recurrent bile duct stenosis, multiple gastric ulcers and GERD without esophagitis. She has been unable to get an EGD because of the COVID-19 situation. Every time she is about to be scheduled for one  she comes down with an upper respiratory infection and is unable to get it done. She was initially under the care of a gastroenterologist in [facility]. He placed bile duct stents. .  This worked for a little bit but then she would have recurrent infections and they would have to come out. She has been on famotidine and pantoprazole for a long time for her gastric ulcers and GERD.   She has recurrent abdominal pain especially in the epigastric area. She has recurrent nausea and vomiting. This makes her dehydrated and she has to give herself IV saline. She also uses IV Zofran for her severe nausea. [facility] doctor who wants to get her stomach symptoms to calm down before he will attempt to do an EGD on her. She has recurrent vomitings most times undigested food. I wonder if she has an element of gastroparesis. I talked her about getting a gastric emptying time but she is reluctant to do it. She finally will be able to see her GI doctor Dr. Oliver [facility] this fall. Her last hemoglobin despite all the iron was only 5.1    She has a history of chronic iron deficiency anemia. Her hemoglobin and hematocrit have significantly declined over the last year. .  She gets IV iron once a week from her Hematologist Dr. Jeet Monk. . IV iron is giving her a lot of problems with swelling of her hands and pain. She was found to be deficient in vitamin B12 and has been started on monthly IM B12 shots 1000 mcg. Despite this her B12 level is still only 334. Hematologist has mentioned a repeat bone marrow biopsy but this has not been scheduled. She has a history along with her hypogammaglobulinemia of neutropenia last WBC count was 2500. The only thing she has not had is thrombocytopenia. Her immune system is very deficient and she obviously gets recurrent infections from this. Years ago she had numerous surgeries on her right knee. She was up at [facility] for most of these. She had to wear a brace for a long time but that was causing a lot of pinching and bruising. She has easy bruisability. She no longer wears a brace but her right knee is unstable and can buckle under any time. Her orthopedic surgeon at [facility]. at one time wondered if she had an autoimmune disorder that was causing her to reject all the things that he was doing to her knee.     Because of all of this she is severely fatigued. She is very weak. It is very difficult for her to stand for over 5 minutes. If she walks she gets fatigued and has to rest after even after  20 feet. She is able to sit for a while but she has to get up and move around. She has a hard time with her hands as her  is weak. 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 She was having recurrent palpitations. She still has the palpitations. I found an old EKG which showed she had a short MS interval but no delta waves consistent with WPW syndrome. Her port was  replaced by Dr. Mady Amato about 8 months ago. She has the port because of her weekly  IV iron  and because when she gets dehydrated she has to give herself extra saline IV. She usually gives herself  One thousand cc of Normal Saline at night. She also had premature ovarian failure diagnosed by Dr. Cruzito Kitchen. With her immunoglobulin deficiency, biliary stenosis, recurrent URIs, and her other problems, I wonder if she does indeed have some type of autoimmune disease causing connective tissue disorder. Have recommended that she see a rheumatologist and  an immunologist.  She will check which facility she is allowed to go to in her insurance handbook. Review of Systems   Constitutional:  Positive for fatigue. Negative for appetite change, chills, fever and unexpected weight change. HENT:  Negative for congestion, ear pain, facial swelling, hearing loss, rhinorrhea, sinus pressure, sinus pain, sore throat, tinnitus and trouble swallowing. Eyes:  Negative for photophobia, pain, discharge, itching and visual disturbance. Respiratory:  Positive for cough. Negative for shortness of breath, wheezing and stridor. Cardiovascular:  Negative for chest pain, palpitations and leg swelling. Gastrointestinal:  Negative for abdominal pain, anal bleeding, blood in stool, constipation, diarrhea, nausea and vomiting.    Endocrine: Negative for cold intolerance, heat intolerance, polydipsia, polyphagia and polyuria. Genitourinary:  Negative for difficulty urinating, dysuria, flank pain, frequency, hematuria and urgency. Musculoskeletal:  Positive for arthralgias, gait problem and myalgias. Negative for joint swelling. Cervicalgia   Skin:  Negative for color change, pallor and rash. Allergic/Immunologic: Negative for environmental allergies and food allergies. Neurological:  Positive for weakness, light-headedness and headaches. Negative for dizziness, tremors, seizures, syncope, speech difficulty and numbness. Hematological:  Negative for adenopathy. Bruises/bleeds easily. Psychiatric/Behavioral:  Negative for agitation, behavioral problems, confusion, sleep disturbance and suicidal ideas. The patient is not nervous/anxious. Current Outpatient Medications:     famotidine (PEPCID) 20 MG tablet, Take 1 tablet by mouth in the morning and 1 tablet before bedtime. , Disp: 60 tablet, Rfl: 3    ondansetron (ZOFRAN-ODT) 4 MG disintegrating tablet, Take 1 tablet by mouth 3 times daily as needed for Nausea or Vomiting, Disp: 21 tablet, Rfl: 2    tiZANidine (ZANAFLEX) 2 MG tablet, Take 1 tablet by mouth 3 times daily as needed (cervicalgia), Disp: 60 tablet, Rfl: 2    pantoprazole (PROTONIX) 40 MG tablet, Take 1 tablet by mouth every morning (before breakfast), Disp: 30 tablet, Rfl: 5    cyanocobalamin 1000 MCG/ML injection, MONTHLY, Disp: , Rfl:     ferric gluconate (FERRLECIT) 12.5 MG/ML injection, Ferric Sodium Gluconate Comple (Ferrlecit 62.5 Mg/5 Ml Vial) 62.5 MG/5 ML Vial Active 125 MG IV Weekly April 6th, 2021 3:13pm mondays, Disp: , Rfl:     MULTIPLE VITAMIN IV, Infuse intravenously, Disp: , Rfl:      Allergies   Allergen Reactions    Cefepime      Other reaction(s): blood pressure changes    Morphine      rash(morphine)  Other reaction(s): emesis, blood pressure changes ,rash    Vancomycin Rash     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    Cephalosporins Other (See Comments)        Past Medical History:   Diagnosis Date    Abdominal pain, epigastric 6/11/2004    Acute bacterial conjunctivitis of both eyes 4/27/2020    Chronic fatigue 7/10/2019    Herpes zoster without complication 9/18/2390    Hypokalemia 4/13/2020    Immunodeficiency ([facility] [address] 7/10/2019    Knee arthropathy 7/10/2019    Migraine without aura and without status migrainosus, not intractable 7/10/2019       Health Maintenance Due   Topic Date Due    Hepatitis C screen  Never done    DTaP/Tdap/Td vaccine (1 - Tdap) Never done    Cervical cancer screen  Never done    Colorectal Cancer Screen  Never done    COVID-19 Vaccine (4 - Booster for Moderna series) 02/12/2022        Patient Active Problem List   Diagnosis    Abdominal pain, epigastric    Chronic fatigue    Immunodeficiency (HCC)    Migraine without aura and without status migrainosus, not intractable    Knee arthropathy    Gastroesophageal reflux disease without esophagitis    Bile duct stenosis    Multiple gastric ulcers    Neutropenia (HCC)    Bronchitis    Iron deficiency anemia due to chronic blood loss    Acute pansinusitis    Hypoglobulinemia    Chronic frontal sinusitis    Vitamin B12 deficiency    Immunocompromised state ([address]    Cervicalgia    New daily persistent headache    Leukopenia    Back muscle spasm    COVID-19    Need for tetanus, diphtheria, and acellular pertussis (Tdap) vaccine    Need for vaccination against Streptococcus pneumoniae        Past Surgical History:   Procedure Laterality Date    CHOLECYSTECTOMY, OPEN      HIP SURGERY Right     KNEE SURGERY Right     SKIN GRAFT      TONSILLECTOMY      TUNNELED CENTRAL VENOUS CATHETER W/ SUBCUTANEOUS PORT      several times        Family History   Problem Relation Age of Onset    Dementia Mother     Heart Disease Father     Migraines Sister         Social History     Tobacco Use    Smoking status: Never    Smokeless tobacco: Never   Substance Use Topics    Alcohol use: Never    Drug use: Never        Objective  Vitals:    07/20/22 0909   BP: 124/68   Pulse: 89   Temp: 98 °F (36.7 °C)   SpO2: 96%   Weight: 118 lb (53.5 kg)   Height: 5' 5\" (1.651 m)        Exam:  Physical Exam  Vitals reviewed. Constitutional:       General: She is not in acute distress. Appearance: She is well-developed and normal weight. She is not ill-appearing. HENT:      Head: Normocephalic and atraumatic. Right Ear: Tympanic membrane, ear canal and external ear normal.      Left Ear: Tympanic membrane, ear canal and external ear normal.      Nose: Nose normal.      Mouth/Throat:      Mouth: Mucous membranes are moist.      Pharynx: Oropharynx is clear. No oropharyngeal exudate or posterior oropharyngeal erythema. Eyes:      General: No scleral icterus. Right eye: No discharge. Left eye: No discharge. Comments: Pale conjunctiva   Neck:      Thyroid: No thyromegaly. Cardiovascular:      Rate and Rhythm: Normal rate and regular rhythm. Heart sounds: Normal heart sounds. No murmur heard. No friction rub. No gallop. Pulmonary:      Effort: Pulmonary effort is normal. No respiratory distress. Breath sounds: Normal breath sounds. No wheezing, rhonchi or rales. Chest:      Chest wall: No tenderness. Musculoskeletal:         General: Deformity present. No tenderness. Cervical back: Normal range of motion and neck supple. Comments: Deformity right knee with decreased range of motion to flexion       Lymphadenopathy:      Cervical: No cervical adenopathy. Skin:     General: Skin is warm and dry. Coloration: Skin is not pale. Findings: No bruising, erythema or rash. Neurological:      General: No focal deficit present. Mental Status: She is alert and oriented to person, place, and time. Cranial Nerves: No cranial nerve deficit. Sensory: No sensory deficit.       Deep Tendon Reflexes: Reflexes normal.   Psychiatric:         Mood and Affect: Mood normal.         Behavior: Behavior normal.         Thought Content: Thought content normal.         Judgment: Judgment normal.        Last labs reviewed. ASSESSMENT & PLAN :   Problem List          Digestive    Gastroesophageal reflux disease without esophagitis - Primary    Relevant Medications    pantoprazole (PROTONIX) 40 MG tablet    ondansetron (ZOFRAN-ODT) 4 MG disintegrating tablet    famotidine (PEPCID) 20 MG tablet    Multiple gastric ulcers    Relevant Medications    pantoprazole (PROTONIX) 40 MG tablet    famotidine (PEPCID) 20 MG tablet       Other    Need for vaccination against Streptococcus pneumoniae     Prevnar 20 given today          Relevant Orders    Pneumococcal, PCV20, PREVNAR 20, (age 25 yrs+), IM, PF (Completed)    Chronic fatigue       multifactorial with her anemia playing a large role         Immunodeficiency (HCC)    Iron deficiency anemia due to chronic blood loss       continue IV iron and as needed transfusions as per hematology. Await recommendations  from GI once they have seen her             Return in about 3 months (around 10/20/2022), or anemia, kidney stones.         Anel De Guzman,   7/20/2022

## 2022-08-08 ENCOUNTER — OFFICE VISIT (OUTPATIENT)
Dept: FAMILY MEDICINE CLINIC | Age: 57
End: 2022-08-08
Payer: COMMERCIAL

## 2022-08-08 VITALS
TEMPERATURE: 101.1 F | DIASTOLIC BLOOD PRESSURE: 60 MMHG | HEIGHT: 65 IN | RESPIRATION RATE: 18 BRPM | HEART RATE: 101 BPM | OXYGEN SATURATION: 94 % | WEIGHT: 116 LBS | BODY MASS INDEX: 19.33 KG/M2 | SYSTOLIC BLOOD PRESSURE: 128 MMHG

## 2022-08-08 DIAGNOSIS — B34.9 VIRAL ILLNESS: Primary | ICD-10-CM

## 2022-08-08 LAB
Lab: NORMAL
PERFORMING INSTRUMENT: NORMAL
QC PASS/FAIL: NORMAL
SARS-COV-2, POC: NORMAL

## 2022-08-08 PROCEDURE — 87426 SARSCOV CORONAVIRUS AG IA: CPT

## 2022-08-08 PROCEDURE — 99213 OFFICE O/P EST LOW 20 MIN: CPT

## 2022-08-08 NOTE — PROGRESS NOTES
Chief Complaint       Generalized Body Aches and Headache    History of Present Illness   Source of history provided by:  patient. Debbe Buerger is a 62 y.o. old female presenting to the walk in clinic for evaluation of body aches, headaches, nasal drainage, and fever (103.7F Tmax) since yesterday. Denies any loss of taste or smell, CP, dyspnea, LE edema, abdominal pain, vomiting, rash, or lethargy. Denies any hx of asthma or COPD. Patient denies recent sick exposures. Patient has been vaccinated for COVID-19. Patient has been taking Excerderin and Advil OTC without symptomatic relief. ROS    Unless otherwise stated in this report or unable to obtain because of the patient's clinical or mental status as evidenced by the medical record, this patients's positive and negative responses for Review of Systems, constitutional, psych, eyes, ENT, cardiovascular, respiratory, gastrointestinal, neurological, genitourinary, musculoskeletal, integument systems and systems related to the presenting problem are either stated in the preceding or were not pertinent or were negative for the symptoms and/or complaints related to the medical problem. Physical Exam         VS:  /60   Pulse (!) 101   Temp (!) 101.1 °F (38.4 °C) (Temporal)   Resp 18   Ht 5' 5\" (1.651 m)   Wt 116 lb (52.6 kg)   SpO2 94%   BMI 19.30 kg/m²    Oxygen Saturation Interpretation: Normal.    Constitutional:  Alert, development consistent with age. NAD. Head:  NC/NT. Airway patent. Mouth: Posterior pharynx with mild erythema and clear postnasal drip. No tonsillar hypertrophy or exudate. Neck:  Normal ROM. Supple. No anterior cervical adenopathy noted. Lungs: CTAB without wheezes, rales, or rhonchi. CV:  Regular rate and rhythm, normal heart sounds, without pathological murmurs, ectopy, gallops, or rubs. Skin:  Normal turgor. Warm, dry, without visible rash. Lymphatic: No lymphangitis or adenopathy noted.   Neurological: Oriented. Motor functions intact. Lab / Imaging Results   (All laboratory and radiology results have been personally reviewed by myself)  Labs:  Results for orders placed or performed in visit on 08/08/22   POCT COVID-19, Antigen   Result Value Ref Range    SARS-COV-2, POC Not-Detected Not Detected    Lot Number [id number]     QC Pass/Fail pass     Performing Instrument BD Veritor        Imaging: All Radiology results interpreted by Radiologist unless otherwise noted. Assessment / Plan     Impression(s): [facility] was seen today for generalized body aches and headache. Diagnoses and all orders for this visit:    Viral illness  -     POCT COVID-19, Antigen    Disposition:  Disposition: Discharge to home. Patient does have fever in office. She has not taken medications since yesterday. Rapid COVID-19 testing is negative in office. Pt should also be fever free for 24 hours and symptoms should be improved overall prior to returning. Increase fluids and rest. Symptomatic relief discussed including Tylenol prn pain/fever. Schedule f/u with PCP in 7-10 days if symptoms persist. ED sooner if symptoms worsen or change. ED immediately with high or refractory fever, progressive SOB, dyspnea, CP, calf pain/swelling, shaking chills, vomiting, abdominal pain, lethargy, flank pain, or decreased urinary output. Pt verbalizes understanding and is in agreement with plan of care. All questions answered. LEW Miller    **This report was transcribed using voice recognition software. Every effort was made to ensure accuracy; however, inadvertent computerized transcription errors may be present.

## 2022-08-22 ENCOUNTER — TELEPHONE (OUTPATIENT)
Dept: PRIMARY CARE CLINIC | Age: 57
End: 2022-08-22

## 2022-08-22 NOTE — TELEPHONE ENCOUNTER
[facility] called and needed a verbal to renew the multiple vitamin IV for Cassandra Gomes. Can they renew it for the year?

## 2022-08-24 DIAGNOSIS — R60.0 LOCALIZED EDEMA: Primary | ICD-10-CM

## 2022-08-31 ENCOUNTER — OFFICE VISIT (OUTPATIENT)
Dept: PRIMARY CARE CLINIC | Age: 57
End: 2022-08-31
Payer: COMMERCIAL

## 2022-08-31 VITALS
SYSTOLIC BLOOD PRESSURE: 132 MMHG | BODY MASS INDEX: 18.66 KG/M2 | TEMPERATURE: 98 F | DIASTOLIC BLOOD PRESSURE: 72 MMHG | OXYGEN SATURATION: 97 % | WEIGHT: 112 LBS | HEART RATE: 102 BPM | HEIGHT: 65 IN

## 2022-08-31 DIAGNOSIS — I07.1 TRICUSPID VALVE INSUFFICIENCY, UNSPECIFIED ETIOLOGY: ICD-10-CM

## 2022-08-31 DIAGNOSIS — R78.81 BACTEREMIA: ICD-10-CM

## 2022-08-31 DIAGNOSIS — D50.0 IRON DEFICIENCY ANEMIA DUE TO CHRONIC BLOOD LOSS: ICD-10-CM

## 2022-08-31 DIAGNOSIS — K21.9 GASTROESOPHAGEAL REFLUX DISEASE WITHOUT ESOPHAGITIS: ICD-10-CM

## 2022-08-31 DIAGNOSIS — Z09 HOSPITAL DISCHARGE FOLLOW-UP: ICD-10-CM

## 2022-08-31 DIAGNOSIS — E44.0 MODERATE PROTEIN-CALORIE MALNUTRITION (HCC): ICD-10-CM

## 2022-08-31 DIAGNOSIS — D84.9 IMMUNOCOMPROMISED STATE (HCC): ICD-10-CM

## 2022-08-31 DIAGNOSIS — K25.9 MULTIPLE GASTRIC ULCERS: ICD-10-CM

## 2022-08-31 DIAGNOSIS — I82.90 ACUTE DEEP VEIN THROMBOSIS (DVT) OF NON-EXTREMITY VEIN: Primary | ICD-10-CM

## 2022-08-31 DIAGNOSIS — I47.20 VENTRICULAR TACHYCARDIA: ICD-10-CM

## 2022-08-31 DIAGNOSIS — G44.89 OTHER HEADACHE SYNDROME: ICD-10-CM

## 2022-08-31 DIAGNOSIS — R53.82 CHRONIC FATIGUE: ICD-10-CM

## 2022-08-31 DIAGNOSIS — M54.2 CERVICALGIA: ICD-10-CM

## 2022-08-31 PROBLEM — M70.22 OLECRANON BURSITIS, LEFT ELBOW: Status: ACTIVE | Noted: 2022-08-31

## 2022-08-31 PROBLEM — R94.31 ABNORMAL ELECTROCARDIOGRAPHY: Status: ACTIVE | Noted: 2022-08-31

## 2022-08-31 PROBLEM — I82.409 DVT (DEEP VENOUS THROMBOSIS) (HCC): Status: ACTIVE | Noted: 2022-08-31

## 2022-08-31 PROBLEM — K80.50 COMMON BILE DUCT CALCULI: Status: ACTIVE | Noted: 2022-08-31

## 2022-08-31 PROCEDURE — G8420 CALC BMI NORM PARAMETERS: HCPCS | Performed by: INTERNAL MEDICINE

## 2022-08-31 PROCEDURE — 99214 OFFICE O/P EST MOD 30 MIN: CPT | Performed by: INTERNAL MEDICINE

## 2022-08-31 PROCEDURE — 1111F DSCHRG MED/CURRENT MED MERGE: CPT | Performed by: INTERNAL MEDICINE

## 2022-08-31 PROCEDURE — G8427 DOCREV CUR MEDS BY ELIG CLIN: HCPCS | Performed by: INTERNAL MEDICINE

## 2022-08-31 PROCEDURE — 3017F COLORECTAL CA SCREEN DOC REV: CPT | Performed by: INTERNAL MEDICINE

## 2022-08-31 PROCEDURE — 1036F TOBACCO NON-USER: CPT | Performed by: INTERNAL MEDICINE

## 2022-08-31 RX ORDER — METOPROLOL SUCCINATE 25 MG/1
TABLET, EXTENDED RELEASE ORAL
Qty: 30 TABLET | Refills: 5 | Status: SHIPPED
Start: 2022-08-31 | End: 2022-09-15

## 2022-08-31 RX ORDER — HYDROCODONE BITARTRATE AND ACETAMINOPHEN 5; 325 MG/1; MG/1
1 TABLET ORAL EVERY 6 HOURS PRN
Qty: 20 TABLET | Refills: 0 | Status: SHIPPED | OUTPATIENT
Start: 2022-08-31 | End: 2022-09-05

## 2022-08-31 RX ORDER — PANTOPRAZOLE SODIUM 40 MG/1
40 TABLET, DELAYED RELEASE ORAL
Qty: 30 TABLET | Refills: 5 | Status: SHIPPED
Start: 2022-08-31 | End: 2022-09-21 | Stop reason: SDUPTHER

## 2022-08-31 RX ORDER — TIZANIDINE 2 MG/1
2 TABLET ORAL 3 TIMES DAILY PRN
Qty: 60 TABLET | Refills: 2 | Status: SHIPPED | OUTPATIENT
Start: 2022-08-31

## 2022-08-31 RX ORDER — PROCHLORPERAZINE MALEATE 10 MG
TABLET ORAL
COMMUNITY
Start: 2022-03-21

## 2022-08-31 RX ORDER — METOPROLOL SUCCINATE 25 MG/1
TABLET, EXTENDED RELEASE ORAL
COMMUNITY
Start: 2022-08-22 | End: 2022-08-31 | Stop reason: SDUPTHER

## 2022-08-31 RX ORDER — DAPTOMYCIN 50 MG/ML
INJECTION, POWDER, LYOPHILIZED, FOR SOLUTION INTRAVENOUS
COMMUNITY
Start: 2022-08-22

## 2022-08-31 ASSESSMENT — ENCOUNTER SYMPTOMS
VOMITING: 1
EYE DISCHARGE: 0
NAUSEA: 1
ABDOMINAL PAIN: 0
SHORTNESS OF BREATH: 0
SINUS PAIN: 0
CONSTIPATION: 0
COUGH: 0
SORE THROAT: 0
WHEEZING: 0
BLOOD IN STOOL: 0

## 2022-08-31 NOTE — ASSESSMENT & PLAN NOTE
continue PPI and follow-up with GI in [facility] [place], [facility]

## 2022-08-31 NOTE — ASSESSMENT & PLAN NOTE
unable to take NSAID's because of GI problems. Acetaminophen by itself does not work. She was given hydrocodone/acetaminophen while in the hospital which helped relieve her severe headaches. We will give her prescription for this. OARRS report reviewed and she has no problematic medications.   She will not be on this for any  prolonged  time

## 2022-08-31 NOTE — PROGRESS NOTES
2022    Name: Roseline Nova : 1965 Sex: female  Age: 62 y.o. Subjective:  Chief Complaint   Patient presents with    Follow-Up from Hospital        HPI    In early August of this year Herrera Vasquez started feeling weaker than usual, temperature was over 104. She was seen twice at urgent care centers where she was told she probably had a viral infection. COVID-19 was negative at that time. She was then seen at the ED at [facility] on 2022 when her temperature would not go down with antipyretics. Reviewed that admission, H&P, labs, x-rays, consults and discharge summary. She was found to have an infected port which was removed, she was seen by ID, she had MSSA bacteremia, echocardiogram did not show any vegetations of her valves. She is on daptomycin daily until the end of September. She will be following up with ID. During this admission she also had a bout of nonsustained ventricular tachycardia. She was seen by cardiology. She will be following up with Dr. Destin Kapoor. Because of this and her elevated heart rate she was started on metoprolol succinate 25 mg daily. She has known iron deficiency anemia under the care of Dr. Dale Llanes. She was given at least 4 units of packed red blood cells that I can see in her chart. She underwent a stress test which was negative for ischemia. Dr. Lorre Snellen placed a PICC line in her left arm. Dr. Angelina Noyola saw her for olecranon bursitis, reviewed his reports. She was quite malnourished, she was unable to eat much at the hospital and her albumin level dropped. She has been trying to eat more and drink milkshakes but she still quite nauseated at times. She was discharged on 2022. Visiting nurses were seeing her in follow-up and on 2022 patient developed swelling above her left clavicle and on the left side of her neck. Stat ultrasound showed acute DVT of the left jugular vein and left subclavian vein.   The PICC line was patent within the left basilic vein. She was started on heparin and then transitioned to Eliquis. Her hemoglobin is dropping again. She is down to a hemoglobin of 8.9 and hematocrit of 28.2. White blood cell count is good at 7000 and platelet counts are 294,685. She has known chronic GI bleed. She has an appointment with her GI doctor at [facility] clinic and will be following up with him. Medication reconciliation done. New medications include Eliquis 5 mg twice daily, metoprolol succinate 25 mg daily and pantoprazole 40 mg daily. Famotidine was discontinued. Review of Systems   Constitutional:  Positive for appetite change, diaphoresis, fatigue, fever and unexpected weight change. Negative for chills. HENT:  Negative for congestion, nosebleeds, sinus pain and sore throat. Eyes:  Negative for discharge. Respiratory:  Negative for cough, shortness of breath and wheezing. Cardiovascular:  Negative for chest pain, palpitations and leg swelling. Gastrointestinal:  Positive for nausea and vomiting. Negative for abdominal pain, blood in stool and constipation. Genitourinary:  Negative for difficulty urinating, dyspareunia and hematuria. Musculoskeletal:  Positive for myalgias and neck pain. Negative for gait problem. Left elbow pain   Skin:  Negative for rash and wound. Neurological:  Positive for weakness and headaches. Negative for seizures, speech difficulty and numbness. Hematological:  Bruises/bleeds easily. Psychiatric/Behavioral:  Positive for sleep disturbance. Negative for agitation, behavioral problems, confusion and decreased concentration.          Current Outpatient Medications:     prochlorperazine (COMPAZINE) 10 MG tablet, Every 6 hours as needed, Disp: , Rfl:     DAPTOmycin (CUBICIN) 500 MG injection, Q24H, Disp: , Rfl:     Probiotic Product CAPS, 1 capsule, Disp: , Rfl:     tiZANidine (ZANAFLEX) 2 MG tablet, Take 1 tablet by mouth 3 times daily as needed (cervicalgia), Disp: 60 tablet, Rfl: 2    metoprolol succinate (TOPROL XL) 25 MG extended release tablet, Every Day, Disp: 30 tablet, Rfl: 5    pantoprazole (PROTONIX) 40 MG tablet, Take 1 tablet by mouth every morning (before breakfast), Disp: 30 tablet, Rfl: 5    HYDROcodone-acetaminophen (NORCO) 5-325 MG per tablet, Take 1 tablet by mouth every 6 hours as needed for Pain for up to 5 days. Intended supply: 5 days.  Take lowest dose possible to manage pain, Disp: 20 tablet, Rfl: 0    ondansetron (ZOFRAN-ODT) 4 MG disintegrating tablet, Take 1 tablet by mouth 3 times daily as needed for Nausea or Vomiting, Disp: 21 tablet, Rfl: 2    cyanocobalamin 1000 MCG/ML injection, MONTHLY, Disp: , Rfl:     ferric gluconate (FERRLECIT) 12.5 MG/ML injection, Ferric Sodium Gluconate Comple (Ferrlecit 62.5 Mg/5 Ml Vial) 62.5 MG/5 ML Vial Active 125 MG IV Weekly April 6th, 2021 3:13pm mondays, Disp: , Rfl:     MULTIPLE VITAMIN IV, Infuse intravenously, Disp: , Rfl:      Allergies   Allergen Reactions    Cefepime      Other reaction(s): blood pressure changes    Morphine      rash(morphine)  Other reaction(s): emesis, blood pressure changes ,rash    Vancomycin Rash     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    Cephalosporins Other (See Comments)        Past Medical History:   Diagnosis Date    Abdominal pain, epigastric 6/11/2004    Acute bacterial conjunctivitis of both eyes 4/27/2020    Chronic fatigue 7/10/2019    Herpes zoster without complication 7/36/2084    Hypokalemia 4/13/2020    Immunodeficiency ([address] 7/10/2019    Knee arthropathy 7/10/2019    Migraine without aura and without status migrainosus, not intractable 7/10/2019       Health Maintenance Due   Topic Date Due    Cervical cancer screen  Never done    Colorectal Cancer Screen  Never done    COVID-19 Vaccine (4 - Booster for Moderna series) 02/04/2022        Patient Active Problem List   Diagnosis    Abdominal pain, epigastric    Chronic fatigue    Immunodeficiency ([address] Migraine without aura and without status migrainosus, not intractable    Knee arthropathy    Gastroesophageal reflux disease without esophagitis    Bile duct stenosis    Multiple gastric ulcers    Neutropenia (HCC)    Bronchitis    Iron deficiency anemia due to chronic blood loss    Acute pansinusitis    Hypoglobulinemia    Chronic frontal sinusitis    Vitamin B12 deficiency    Immunocompromised state ([address]    Cervicalgia    New daily persistent headache    Leukopenia    Back muscle spasm    COVID-19    Need for tetanus, diphtheria, and acellular pertussis (Tdap) vaccine    Need for vaccination against Streptococcus pneumoniae    Tricuspid regurgitation    Moderate protein-calorie malnutrition (HCC)    Olecranon bursitis, left elbow    Ventricular tachycardia (HCC)    DVT (deep venous thrombosis) (HCC)    Bacteremia    Abnormal electrocardiography    Common bile duct calculi    Other headache syndrome        Past Surgical History:   Procedure Laterality Date    CHOLECYSTECTOMY, OPEN      HIP SURGERY Right     KNEE SURGERY Right     SKIN GRAFT      TONSILLECTOMY      TUNNELED CENTRAL VENOUS CATHETER W/ SUBCUTANEOUS PORT      several times        Family History   Problem Relation Age of Onset    Dementia Mother     Heart Disease Father     Migraines Sister         Social History     Tobacco Use    Smoking status: Never    Smokeless tobacco: Never   Substance Use Topics    Alcohol use: Never    Drug use: Never        Objective  Vitals:    08/31/22 0932   BP: 132/72   Pulse: (!) 102   Temp: 98 °F (36.7 °C)   SpO2: 97%   Weight: 112 lb (50.8 kg)   Height: 5' 5\" (1.651 m)        Exam:  Physical Exam  Vitals reviewed. Constitutional:       General: She is not in acute distress. Appearance: She is not ill-appearing. Comments: Thin female, she has lost 6 pounds   HENT:      Head: Normocephalic and atraumatic. Eyes:      General: No scleral icterus. Right eye: No discharge.          Left eye: No discharge. Comments: Pale conjunctiva   Cardiovascular:      Rate and Rhythm: Normal rate and regular rhythm. Heart sounds: No murmur heard. Pulmonary:      Effort: Pulmonary effort is normal. No respiratory distress. Breath sounds: Normal breath sounds. No wheezing or rales. Abdominal:      General: Abdomen is flat. Musculoskeletal:      Cervical back: Neck supple. No rigidity or tenderness. Right lower leg: No edema. Left lower leg: No edema. Comments: PICC line in left arm. Slight swelling of the left olecranon bursa without any tenderness   Skin:     General: Skin is warm and dry. Findings: No rash. Comments: Pale   Neurological:      General: No focal deficit present. Mental Status: She is alert and oriented to person, place, and time. Gait: Gait normal.   Psychiatric:         Mood and Affect: Mood normal.         Behavior: Behavior normal.         Thought Content: Thought content normal.         Judgment: Judgment normal.        Last labs reviewed. ASSESSMENT & PLAN :   Problem List          Circulatory    Tricuspid regurgitation       follow-up with cardiology         Relevant Medications    metoprolol succinate (TOPROL XL) 25 MG extended release tablet    Ventricular tachycardia (HCC)       resolved, continue metoprolol succinate 25 mg daily prescription written         Relevant Medications    metoprolol succinate (TOPROL XL) 25 MG extended release tablet    DVT (deep venous thrombosis) (HCC) - Primary       continue Eliquis 5 mg twice daily follow-up with hematology and they will decide when to discontinue the medication.   Watch for any increased bleeding            Digestive    Gastroesophageal reflux disease without esophagitis       continue pantoprazole 40 mg daily prescription written         Relevant Medications    ondansetron (ZOFRAN-ODT) 4 MG disintegrating tablet    Probiotic Product CAPS    pantoprazole (PROTONIX) 40 MG tablet Multiple gastric ulcers       continue PPI and follow-up with GI in [place]         Relevant Medications    pantoprazole (PROTONIX) 40 MG tablet       Other    Moderate protein-calorie malnutrition (HCC)       milkshakes with extra whey protein daily         Bacteremia       MSSA secondary to infected port. Follow-up with ID, the visiting nurses to follow as well as she gets daptomycin daily. Blood work ordered by ID         Other headache syndrome       unable to take NSAID's because of GI problems. Acetaminophen by itself does not work. She was given hydrocodone/acetaminophen while in the hospital which helped relieve her severe headaches. We will give her prescription for this. OARRS report reviewed and she has no problematic medications. She will not be on this for any  prolonged  time         Relevant Medications    tiZANidine (ZANAFLEX) 2 MG tablet    metoprolol succinate (TOPROL XL) 25 MG extended release tablet    HYDROcodone-acetaminophen (NORCO) 5-325 MG per tablet    Chronic fatigue       multifactorial         Iron deficiency anemia due to chronic blood loss       Ferrlecit and transfusions per hematology         Immunocompromised state [facility])       discussed with Dr. Phill Ocasio about Critical access hospital prophylactic medication against COVID-19         Cervicalgia       tizanidine as needed, prescription written         Relevant Medications    tiZANidine (ZANAFLEX) 2 MG tablet    HYDROcodone-acetaminophen (Jason Johnnie) 5-325 MG per tablet        Return in about 1 month (around 9/30/2022), or malnutrition, DVT, bacteremia.        Markus Solorio,   8/31/2022

## 2022-09-15 DIAGNOSIS — I49.8 OTHER CARDIAC ARRHYTHMIA: Primary | ICD-10-CM

## 2022-09-15 RX ORDER — METOPROLOL SUCCINATE 25 MG/1
25 TABLET, EXTENDED RELEASE ORAL DAILY
Qty: 30 TABLET | Refills: 5 | Status: SHIPPED | OUTPATIENT
Start: 2022-09-15

## 2022-09-21 DIAGNOSIS — K25.9 MULTIPLE GASTRIC ULCERS: ICD-10-CM

## 2022-09-21 DIAGNOSIS — K21.9 GASTROESOPHAGEAL REFLUX DISEASE WITHOUT ESOPHAGITIS: ICD-10-CM

## 2022-09-21 RX ORDER — PANTOPRAZOLE SODIUM 40 MG/1
40 TABLET, DELAYED RELEASE ORAL
Qty: 30 TABLET | Refills: 5 | Status: SHIPPED | OUTPATIENT
Start: 2022-09-21

## 2022-09-27 ENCOUNTER — OFFICE VISIT (OUTPATIENT)
Dept: PRIMARY CARE CLINIC | Age: 57
End: 2022-09-27
Payer: MEDICARE

## 2022-09-27 VITALS
HEART RATE: 83 BPM | OXYGEN SATURATION: 98 % | WEIGHT: 115 LBS | TEMPERATURE: 97.5 F | DIASTOLIC BLOOD PRESSURE: 70 MMHG | HEIGHT: 65 IN | BODY MASS INDEX: 19.16 KG/M2 | SYSTOLIC BLOOD PRESSURE: 122 MMHG

## 2022-09-27 DIAGNOSIS — K25.9 MULTIPLE GASTRIC ULCERS: ICD-10-CM

## 2022-09-27 DIAGNOSIS — I82.90 ACUTE DEEP VEIN THROMBOSIS (DVT) OF NON-EXTREMITY VEIN: ICD-10-CM

## 2022-09-27 DIAGNOSIS — E86.0 DEHYDRATION: ICD-10-CM

## 2022-09-27 DIAGNOSIS — K90.89 OTHER SPECIFIED INTESTINAL MALABSORPTION: ICD-10-CM

## 2022-09-27 DIAGNOSIS — D50.0 IRON DEFICIENCY ANEMIA DUE TO CHRONIC BLOOD LOSS: ICD-10-CM

## 2022-09-27 DIAGNOSIS — E53.8 VITAMIN B12 DEFICIENCY: ICD-10-CM

## 2022-09-27 DIAGNOSIS — D72.818 OTHER DECREASED WHITE BLOOD CELL (WBC) COUNT: ICD-10-CM

## 2022-09-27 DIAGNOSIS — R93.89 ABNORMAL CXR: Primary | ICD-10-CM

## 2022-09-27 DIAGNOSIS — D84.9 IMMUNOCOMPROMISED STATE (HCC): ICD-10-CM

## 2022-09-27 DIAGNOSIS — E44.0 MODERATE PROTEIN-CALORIE MALNUTRITION (HCC): ICD-10-CM

## 2022-09-27 DIAGNOSIS — K21.9 GASTROESOPHAGEAL REFLUX DISEASE WITHOUT ESOPHAGITIS: ICD-10-CM

## 2022-09-27 PROCEDURE — 99214 OFFICE O/P EST MOD 30 MIN: CPT | Performed by: INTERNAL MEDICINE

## 2022-09-27 ASSESSMENT — ENCOUNTER SYMPTOMS
BLOOD IN STOOL: 0
WHEEZING: 0
CONSTIPATION: 0
SORE THROAT: 0
SINUS PAIN: 0
NAUSEA: 1
SHORTNESS OF BREATH: 0
EYE DISCHARGE: 0
VOMITING: 1
COUGH: 0
ABDOMINAL PAIN: 0

## 2022-09-27 NOTE — ASSESSMENT & PLAN NOTE
hold on any vaccinations until approved by her oncologist.  She will need a flu shot and eventually her COVID-19 booster

## 2022-09-27 NOTE — PROGRESS NOTES
2022    Name: Bambi Desouza : 1965 Sex: female  Age: 62 y.o. Subjective:  No chief complaint on file. HPI    In early August of this year Zebedee Jeans started feeling weaker than usual, temperature was over 104. She was seen twice at urgent care centers where she was told she probably had a viral infection. COVID-19 was negative at that time. She was then seen at the ED at [facility] on 2022 when her temperature would not go down with antipyretics. Reviewed that admission, H&P, labs, x-rays, consults and discharge summary. She was found to have an infected port which was removed, she was seen by ID, she had MSSA bacteremia, echocardiogram did not show any vegetations of her valves. She is on daptomycin daily until the end of September. She had her last daptomycin infusion today. She saw Dr. Essie Jordan. Her sed rate was slightly elevated. He did not think that was significant. In August when she was in the hospital she had a chest x-ray which showed some infiltrates. With her antibiotics that should have resolved by now and I will recheck her x-ray. Dr. Essie Jordan felt that she should have another ultrasound of her left upper chest where the DVT was found before they pulled the PICC line. After the PICC line was pulled she could be considered for replacement of her port as she has been unable to give herself IV fluids and medications through this PICC line. ID also wanted blood cultures done prior to having the PICC line discontinued      During this admission she also had a bout of nonsustained ventricular tachycardia. She was seen by cardiology. She will be following up with Dr. Tera Colorado. Because of this and her elevated heart rate she was started on metoprolol succinate 25 mg daily. She will see him in the near future     She has known iron deficiency anemia under the care of Dr. Ping Henriquez.   She was given at least 4 units of packed red blood cells that I can see in her chart. She underwent a stress test which was negative for ischemia. Dr. Michelle Sanchez placed a PICC line in her left arm. Dr. Kate Smith saw her for olecranon bursitis, reviewed his reports. She was quite malnourished, she was unable to eat much at the hospital and her albumin level dropped. She has been trying to eat more and drink milkshakes but she still quite nauseated at times. She was discharged on 8/22/2022. Visiting nurses were seeing her in follow-up and on 8/24/2022 patient developed swelling above her left clavicle and on the left side of her neck. Stat ultrasound showed acute DVT of the left jugular vein and left subclavian vein. The PICC line was patent within the left basilic vein. She was started on heparin and then transitioned to Eliquis. Her hemoglobin is dropping again. hemoglobin of 8.9 and hematocrit of 28.2. White blood cell count is good at 7000 and platelet counts are 111,998. Today hemoglobin is down to her usual 6.6 g and her white blood cell count is down to 3900. She has known chronic GI bleed. She has an appointment with her GI doctor at [facility] clinic and will be following up with him. Medication reconciliation done. New medications include Eliquis 5 mg twice daily, metoprolol succinate 25 mg daily and pantoprazole 40 mg daily. Famotidine was discontinued. Review of Systems   Constitutional:  Positive for appetite change, diaphoresis, fatigue, fever and unexpected weight change. Negative for chills. HENT:  Negative for congestion, nosebleeds, sinus pain and sore throat. Eyes:  Negative for discharge. Respiratory:  Negative for cough, shortness of breath and wheezing. Cardiovascular:  Negative for chest pain, palpitations and leg swelling. Gastrointestinal:  Positive for nausea and vomiting. Negative for abdominal pain, blood in stool and constipation. Genitourinary:  Negative for difficulty urinating, dyspareunia and hematuria.    Musculoskeletal: Positive for myalgias and neck pain. Negative for gait problem. Left elbow pain   Skin:  Negative for rash and wound. Neurological:  Positive for weakness and headaches. Negative for seizures, speech difficulty and numbness. Hematological:  Bruises/bleeds easily. Psychiatric/Behavioral:  Positive for sleep disturbance. Negative for agitation, behavioral problems, confusion and decreased concentration.          Current Outpatient Medications:     pantoprazole (PROTONIX) 40 MG tablet, Take 1 tablet by mouth every morning (before breakfast), Disp: 30 tablet, Rfl: 5    Probiotic Product CAPS, Take 1 capsule by mouth daily Give Florajen capsules, Disp: 30 capsule, Rfl: 0    metoprolol succinate (TOPROL XL) 25 MG extended release tablet, Take 1 tablet by mouth daily, Disp: 30 tablet, Rfl: 5    prochlorperazine (COMPAZINE) 10 MG tablet, Every 6 hours as needed, Disp: , Rfl:     DAPTOmycin (CUBICIN) 500 MG injection, Q24H, Disp: , Rfl:     tiZANidine (ZANAFLEX) 2 MG tablet, Take 1 tablet by mouth 3 times daily as needed (cervicalgia), Disp: 60 tablet, Rfl: 2    ondansetron (ZOFRAN-ODT) 4 MG disintegrating tablet, Take 1 tablet by mouth 3 times daily as needed for Nausea or Vomiting, Disp: 21 tablet, Rfl: 2    cyanocobalamin 1000 MCG/ML injection, MONTHLY, Disp: , Rfl:     ferric gluconate (FERRLECIT) 12.5 MG/ML injection, Ferric Sodium Gluconate Comple (Ferrlecit 62.5 Mg/5 Ml Vial) 62.5 MG/5 ML Vial Active 125 MG IV Weekly April 6th, 2021 3:13pm mondays, Disp: , Rfl:     MULTIPLE VITAMIN IV, Infuse intravenously (Patient not taking: Reported on 9/27/2022), Disp: , Rfl:      Allergies   Allergen Reactions    Cefepime      Other reaction(s): blood pressure changes    Morphine      rash(morphine)  Other reaction(s): emesis, blood pressure changes ,rash    Vancomycin Rash     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    Cephalosporins Other (See Comments)        Past Medical History:   Diagnosis Date    Abdominal pain, epigastric 6/11/2004    Acute bacterial conjunctivitis of both eyes 4/27/2020    Chronic fatigue 7/10/2019    Herpes zoster without complication 3/85/8141    Hypokalemia 4/13/2020    Immunodeficiency ([address] 7/10/2019    Knee arthropathy 7/10/2019    Migraine without aura and without status migrainosus, not intractable 7/10/2019       Health Maintenance Due   Topic Date Due    Hepatitis C screen  Never done    DTaP/Tdap/Td vaccine (1 - Tdap) Never done    Cervical cancer screen  Never done    Colorectal Cancer Screen  Never done    COVID-19 Vaccine (4 - Booster for Moderna series) 02/04/2022    Flu vaccine (1) 08/01/2022        Patient Active Problem List   Diagnosis    Abdominal pain, epigastric    Chronic fatigue    Immunodeficiency ([address]    Migraine without aura and without status migrainosus, not intractable    Knee arthropathy    Gastroesophageal reflux disease without esophagitis    Bile duct stenosis    Multiple gastric ulcers    Neutropenia (HCC)    Bronchitis    Iron deficiency anemia due to chronic blood loss    Acute pansinusitis    Hypoglobulinemia    Chronic frontal sinusitis    Vitamin B12 deficiency    Immunocompromised state ([address]    Cervicalgia    New daily persistent headache    Leukopenia    Back muscle spasm    COVID-19    Need for tetanus, diphtheria, and acellular pertussis (Tdap) vaccine    Need for vaccination against Streptococcus pneumoniae    Tricuspid regurgitation    Moderate protein-calorie malnutrition (HCC)    Olecranon bursitis, left elbow    Ventricular tachycardia (HCC)    DVT (deep venous thrombosis) (HCC)    Bacteremia    Abnormal electrocardiography    Common bile duct calculi    Other headache syndrome        Past Surgical History:   Procedure Laterality Date    CHOLECYSTECTOMY, OPEN      HIP SURGERY Right     KNEE SURGERY Right     SKIN GRAFT      TONSILLECTOMY      TUNNELED CENTRAL VENOUS CATHETER W/ SUBCUTANEOUS PORT      several times        Family History   Problem Relation Age of Onset    Dementia Mother     Heart Disease Father     Migraines Sister         Social History     Tobacco Use    Smoking status: Never    Smokeless tobacco: Never   Substance Use Topics    Alcohol use: Never    Drug use: Never        Objective  Vitals:    09/27/22 1157   BP: 122/70   Pulse: 83   Temp: 97.5 °F (36.4 °C)   SpO2: 98%   Weight: 115 lb (52.2 kg)   Height: 5' 5\" (1.651 m)        Exam:  Physical Exam  Vitals reviewed. Constitutional:       General: She is not in acute distress. Appearance: She is not ill-appearing. Comments: Thin female, she has gained 2 pounds   HENT:      Head: Normocephalic and atraumatic. Eyes:      General: No scleral icterus. Right eye: No discharge. Left eye: No discharge. Comments: Pale conjunctiva   Cardiovascular:      Rate and Rhythm: Normal rate and regular rhythm. Heart sounds: No murmur heard. Pulmonary:      Effort: Pulmonary effort is normal. No respiratory distress. Breath sounds: Normal breath sounds. No wheezing or rales. Abdominal:      General: Abdomen is flat. Musculoskeletal:      Cervical back: Neck supple. No rigidity or tenderness. Right lower leg: No edema. Left lower leg: No edema. Comments: PICC line in left arm. Slight swelling of the left olecranon bursa without any tenderness   Skin:     General: Skin is warm and dry. Findings: No rash. Comments: Pale   Neurological:      General: No focal deficit present. Mental Status: She is alert and oriented to person, place, and time. Gait: Gait normal.   Psychiatric:         Mood and Affect: Mood normal.         Behavior: Behavior normal.         Thought Content: Thought content normal.         Judgment: Judgment normal.        Last labs reviewed. ASSESSMENT & PLAN :   Problem List          Circulatory    DVT (deep venous thrombosis) (HCC)       DVT of vein in the upper extremity. Continue apixaban for at least 3 months. This will be discontinued per hematology. Digestive    Gastroesophageal reflux disease without esophagitis       cut back Protonix to 40 mg once a day but continue her famotidine 20 mg twice daily         Relevant Medications    ondansetron (ZOFRAN-ODT) 4 MG disintegrating tablet    pantoprazole (PROTONIX) 40 MG tablet    Probiotic Product CAPS    Multiple gastric ulcers       medication change as above and follow-up with GI in [facility] clinic         Relevant Medications    pantoprazole (PROTONIX) 40 MG tablet       Other    Moderate protein-calorie malnutrition ([address]       she is not eating well. She is dehydrated. She is very weak. She needs her IV medications and fluids. After her PICC line is discontinued she will be seen by Dr. Sigrid Bright to have her port replaced. Referral made to him         Iron deficiency anemia due to chronic blood loss       she is to restart her iron infusions in the near future per hematology. Serial H&H's to be drawn         Vitamin B12 deficiency       continue vitamin B12 per oncology         Immunocompromised state [facility])       hold on any vaccinations until approved by her oncologist.  She will need a flu shot and eventually her COVID-19 booster         Leukopenia       monitor WBCs per hematology             Return in about 3 months (around 12/27/2022), or GERD, Anemia, Malabsorption.        Ash Hernandez, DO  9/27/2022

## 2022-09-27 NOTE — ASSESSMENT & PLAN NOTE
DVT of vein in the upper extremity. Continue apixaban for at least 3 months. This will be discontinued per hematology.

## 2022-09-27 NOTE — ASSESSMENT & PLAN NOTE
she is not eating well. She is dehydrated. She is very weak. She needs her IV medications and fluids. After her PICC line is discontinued she will be seen by Dr. Samara Schilling to have her port replaced.   Referral made to him

## 2022-09-30 ENCOUNTER — TELEPHONE (OUTPATIENT)
Dept: PRIMARY CARE CLINIC | Age: 57
End: 2022-09-30

## 2022-09-30 NOTE — TELEPHONE ENCOUNTER
Elizabeth from [address] [place] [address] Infusion left a message  wanting to bring attention to on 09/26 Maty Patino's hemoglobin being 6.6 and making sure this is being addressed by someone. She said she was unable to get a hold of the patient but she did leave a message letting her know that.

## 2022-10-14 DIAGNOSIS — G89.18 POST-OP PAIN: Primary | ICD-10-CM

## 2022-10-14 RX ORDER — OXYCODONE HYDROCHLORIDE AND ACETAMINOPHEN 5; 325 MG/1; MG/1
1 TABLET ORAL EVERY 6 HOURS PRN
Qty: 12 TABLET | Refills: 0 | Status: SHIPPED | OUTPATIENT
Start: 2022-10-14 | End: 2022-10-17

## 2022-10-17 ENCOUNTER — TELEPHONE (OUTPATIENT)
Dept: PRIMARY CARE CLINIC | Age: 57
End: 2022-10-17

## 2022-10-17 NOTE — TELEPHONE ENCOUNTER
Pharmacy called and said since [facility] has had her mediport installed, she is asking to resume hydration orders. Are you willing to sign for these?  Pharmacy needs called back at [phone] option 1 then option 2 to speak with pharmacist.

## 2022-10-19 DIAGNOSIS — G89.18 POSTOPERATIVE PAIN: Primary | ICD-10-CM

## 2022-10-19 RX ORDER — OXYCODONE HYDROCHLORIDE AND ACETAMINOPHEN 5; 325 MG/1; MG/1
1 TABLET ORAL EVERY 6 HOURS PRN
Qty: 12 TABLET | Refills: 0 | Status: SHIPPED | OUTPATIENT
Start: 2022-10-19 | End: 2022-10-22

## 2022-12-21 ENCOUNTER — OFFICE VISIT (OUTPATIENT)
Dept: PRIMARY CARE CLINIC | Age: 57
End: 2022-12-21
Payer: MEDICARE

## 2022-12-21 VITALS
HEART RATE: 92 BPM | DIASTOLIC BLOOD PRESSURE: 70 MMHG | WEIGHT: 120 LBS | OXYGEN SATURATION: 98 % | BODY MASS INDEX: 19.99 KG/M2 | SYSTOLIC BLOOD PRESSURE: 138 MMHG | HEIGHT: 65 IN | TEMPERATURE: 97.8 F

## 2022-12-21 DIAGNOSIS — Z86.79 HISTORY OF VENTRICULAR TACHYCARDIA: ICD-10-CM

## 2022-12-21 DIAGNOSIS — K25.9 MULTIPLE GASTRIC ULCERS: Primary | ICD-10-CM

## 2022-12-21 DIAGNOSIS — M54.2 CERVICALGIA: ICD-10-CM

## 2022-12-21 DIAGNOSIS — K21.9 GASTROESOPHAGEAL REFLUX DISEASE WITHOUT ESOPHAGITIS: ICD-10-CM

## 2022-12-21 DIAGNOSIS — K83.1 BILE DUCT STENOSIS: ICD-10-CM

## 2022-12-21 DIAGNOSIS — D50.0 IRON DEFICIENCY ANEMIA DUE TO CHRONIC BLOOD LOSS: ICD-10-CM

## 2022-12-21 DIAGNOSIS — K90.49 MALABSORPTION DUE TO INTOLERANCE, NOT ELSEWHERE CLASSIFIED: ICD-10-CM

## 2022-12-21 PROBLEM — U07.1 COVID-19: Status: RESOLVED | Noted: 2022-01-18 | Resolved: 2022-12-21

## 2022-12-21 PROBLEM — I82.409 DVT (DEEP VENOUS THROMBOSIS) (HCC): Status: RESOLVED | Noted: 2022-08-31 | Resolved: 2022-12-21

## 2022-12-21 PROBLEM — J01.40 ACUTE PANSINUSITIS: Status: RESOLVED | Noted: 2020-04-06 | Resolved: 2022-12-21

## 2022-12-21 PROBLEM — J32.1 CHRONIC FRONTAL SINUSITIS: Status: RESOLVED | Noted: 2020-05-27 | Resolved: 2022-12-21

## 2022-12-21 PROBLEM — M70.22 OLECRANON BURSITIS, LEFT ELBOW: Status: RESOLVED | Noted: 2022-08-31 | Resolved: 2022-12-21

## 2022-12-21 PROBLEM — J40 BRONCHITIS: Status: RESOLVED | Noted: 2019-07-24 | Resolved: 2022-12-21

## 2022-12-21 PROCEDURE — 99213 OFFICE O/P EST LOW 20 MIN: CPT | Performed by: INTERNAL MEDICINE

## 2022-12-21 RX ORDER — TIZANIDINE 2 MG/1
2 TABLET ORAL 3 TIMES DAILY PRN
Qty: 60 TABLET | Refills: 2 | Status: SHIPPED | OUTPATIENT
Start: 2022-12-21

## 2022-12-21 RX ORDER — FAMOTIDINE 20 MG/1
TABLET, FILM COATED ORAL
COMMUNITY
Start: 2022-10-24

## 2022-12-21 RX ORDER — DEXTROSE AND SODIUM CHLORIDE 5; .9 G/100ML; G/100ML
INJECTION, SOLUTION INTRAVENOUS
COMMUNITY
Start: 2022-12-08

## 2022-12-21 ASSESSMENT — ENCOUNTER SYMPTOMS
EYE DISCHARGE: 0
SORE THROAT: 0
SHORTNESS OF BREATH: 0
COUGH: 0
WHEEZING: 0
CONSTIPATION: 0
BLOOD IN STOOL: 0
NAUSEA: 1
ABDOMINAL PAIN: 0
SINUS PAIN: 0
VOMITING: 1

## 2022-12-21 NOTE — ASSESSMENT & PLAN NOTE
because of her decreased fluid and food intake she tends to dehydrate easily. She has not been absorbing her iron and her B12. She is being given 1 L of normal saline every night along with a multivitamin dose.

## 2022-12-21 NOTE — ASSESSMENT & PLAN NOTE
Not at goal.  Continue with either Venofer or ferrous at at oncology clinic and monitor her blood counts weekly as per their directions.

## 2022-12-21 NOTE — PROGRESS NOTES
2022    Name: Lenoria Phalen : 1965 Sex: female  Age: 62 y.o. Subjective:  No chief complaint on file. HPI    When she was hospitalized in August she had pulmonary infiltrates suggestive of pneumonia. She was treated with antibiotics and a repeat chest x-ray showed resolution of the infiltrates. During this admission she also had a bout of nonsustained ventricular tachycardia. She was seen by cardiology. She will be following up with Dr. Tuyet Downing. Because of this and her elevated heart rate she was started on metoprolol succinate 25 mg daily. She will see him in the near future     She has known iron deficiency anemia under the care of oncology. She was given at least 4 units of packed red blood cells that I can see in her chart. She underwent a stress test which was negative for ischemia. Dr. Tay De Luna placed a new port on the right. .    She has known chronic GI bleed. She has an appointment with her GI doctor at [facility] and will be following up with him. He will be doing her colonoscopy and other tests to determine the site of her GI blood loss. Her hemoglobin runs around 5 and she has microcytic hypochromic indices. Weekly she was getting Ferrlecit but the supply is low so they switch her over to Venofer which has caused some muscle spasms and aches. She is off the Eliquis. She is on metoprolol succinate 25 mg daily and pantoprazole 40 mg daily. She is on tizanidine as needed neck or back pain and ondansetron as needed nausea. She is on vitamin B12 1000 mcg monthly injection she gives herself IV fluids 1 L along with multivitamins because of her tendency to dehydration and her malabsorption syndrome. Her weight is still low at 120 pounds but she has gained at least 5 pounds. Oncology told her not to get the Shingrix shot. She defers getting the COVID-19 booster for now.     We talked about her Pap   she has not had one done in a while and his not willing to have one done just yet. Review of Systems   Constitutional:  Negative for appetite change, chills, diaphoresis, fatigue, fever and unexpected weight change. HENT:  Negative for congestion, nosebleeds, sinus pain and sore throat. Chemical taste   Eyes:  Negative for discharge. Respiratory:  Negative for cough, shortness of breath and wheezing. Cardiovascular:  Negative for chest pain, palpitations and leg swelling. Gastrointestinal:  Positive for nausea and vomiting. Negative for abdominal pain, blood in stool and constipation. Genitourinary:  Negative for difficulty urinating, dyspareunia and hematuria. Musculoskeletal:  Positive for myalgias and neck pain. Negative for gait problem. Left elbow pain   Skin:  Negative for rash and wound. Neurological:  Positive for weakness and headaches. Negative for seizures, speech difficulty and numbness. Hematological:  Bruises/bleeds easily. Psychiatric/Behavioral:  Negative for agitation, behavioral problems, confusion, decreased concentration and sleep disturbance.          Current Outpatient Medications:     famotidine (PEPCID) 20 MG tablet, TAKE ONE TABLET BY MOUTH IN THE MORNING AND 1 TAB BEFORE BEDTIME, Disp: , Rfl:     Dextrose-Sodium Chloride (DEXTROSE 5 % AND 0.9 % NACL) 5-0.9 % infusion, , Disp: , Rfl:     tiZANidine (ZANAFLEX) 2 MG tablet, Take 1 tablet by mouth 3 times daily as needed (cervicalgia), Disp: 60 tablet, Rfl: 2    pantoprazole (PROTONIX) 40 MG tablet, Take 1 tablet by mouth every morning (before breakfast), Disp: 30 tablet, Rfl: 5    metoprolol succinate (TOPROL XL) 25 MG extended release tablet, Take 1 tablet by mouth daily, Disp: 30 tablet, Rfl: 5    ondansetron (ZOFRAN-ODT) 4 MG disintegrating tablet, Take 1 tablet by mouth 3 times daily as needed for Nausea or Vomiting, Disp: 21 tablet, Rfl: 2    cyanocobalamin 1000 MCG/ML injection, MONTHLY, Disp: , Rfl:     MULTIPLE VITAMIN IV, Infuse intravenously, Disp: , Rfl:      Allergies   Allergen Reactions    Cefepime      Other reaction(s): blood pressure changes    Morphine      rash(morphine)  Other reaction(s): emesis, blood pressure changes ,rash    Vancomycin Rash     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    Cephalosporins Other (See Comments)        Past Medical History:   Diagnosis Date    Abdominal pain, epigastric 6/11/2004    Acute bacterial conjunctivitis of both eyes 4/27/2020    Chronic fatigue 7/10/2019    Herpes zoster without complication 9/99/1523    Hypokalemia 4/13/2020    Immunodeficiency ([facility] [address] 7/10/2019    Knee arthropathy 7/10/2019    Migraine without aura and without status migrainosus, not intractable 7/10/2019       Health Maintenance Due   Topic Date Due    Cervical cancer screen  Never done    Colorectal Cancer Screen  Never done    COVID-19 Vaccine (4 - Booster for Moderna series) 01/07/2022    Annual Wellness Visit (AWV)  Never done    Depression Screen  01/18/2023        Patient Active Problem List   Diagnosis    Abdominal pain, epigastric    Chronic fatigue    Immunodeficiency (HCC)    Migraine without aura and without status migrainosus, not intractable    Knee arthropathy    Gastroesophageal reflux disease without esophagitis    Bile duct stenosis    Multiple gastric ulcers    Neutropenia (HCC)    Iron deficiency anemia due to chronic blood loss    Hypoglobulinemia    Vitamin B12 deficiency    Immunocompromised state ([address]    Cervicalgia    New daily persistent headache    Leukopenia    Back muscle spasm    Need for tetanus, diphtheria, and acellular pertussis (Tdap) vaccine    Need for vaccination against Streptococcus pneumoniae    Tricuspid regurgitation    Moderate protein-calorie malnutrition (HCC)    Abnormal electrocardiography    Common bile duct calculi    Other headache syndrome    Malabsorption due to intolerance, not elsewhere classified    History of ventricular tachycardia        Past Surgical History:   Procedure Laterality Date    CHOLECYSTECTOMY, OPEN      HIP SURGERY Right     KNEE SURGERY Right     SKIN GRAFT      TONSILLECTOMY      TUNNELED CENTRAL VENOUS CATHETER W/ SUBCUTANEOUS PORT      several times        Family History   Problem Relation Age of Onset    Dementia Mother     Heart Disease Father     Migraines Sister         Social History     Tobacco Use    Smoking status: Never    Smokeless tobacco: Never   Substance Use Topics    Alcohol use: Never    Drug use: Never        Objective  Vitals:    12/21/22 1056   BP: 138/70   Pulse: 92   Temp: 97.8 °F (36.6 °C)   SpO2: 98%   Weight: 120 lb (54.4 kg)   Height: 5' 5\" (1.651 m)        Exam:  Physical Exam  Vitals reviewed. Constitutional:       General: She is not in acute distress. Appearance: She is not ill-appearing. Comments: Thin female, she has gained 2 pounds   HENT:      Head: Normocephalic and atraumatic. Eyes:      General: No scleral icterus. Right eye: No discharge. Left eye: No discharge. Comments: Pale conjunctiva   Cardiovascular:      Rate and Rhythm: Normal rate and regular rhythm. Heart sounds: No murmur heard. Pulmonary:      Effort: Pulmonary effort is normal. No respiratory distress. Breath sounds: Normal breath sounds. No wheezing or rales. Abdominal:      General: Abdomen is flat. Musculoskeletal:      Cervical back: Neck supple. No rigidity or tenderness. Right lower leg: No edema. Left lower leg: No edema. Skin:     General: Skin is warm and dry. Findings: No rash. Comments: Pale  IV port palpable right upper chest wall   Neurological:      General: No focal deficit present. Mental Status: She is alert and oriented to person, place, and time. Gait: Gait normal.   Psychiatric:         Mood and Affect: Mood normal.         Behavior: Behavior normal.         Thought Content: Thought content normal.         Judgment: Judgment normal.        Last labs reviewed.       ASSESSMENT & PLAN :   Problem List          Digestive    Malabsorption due to intolerance, not elsewhere classified       because of her decreased fluid and food intake she tends to dehydrate easily. She has not been absorbing her iron and her B12. She is being given 1 L of normal saline every night along with a multivitamin dose. Gastroesophageal reflux disease without esophagitis       stable on pantoprazole. EGD to be done by GI at [facility] clinic. Continue famotidine         Relevant Medications    ondansetron (ZOFRAN-ODT) 4 MG disintegrating tablet    pantoprazole (PROTONIX) 40 MG tablet    famotidine (PEPCID) 20 MG tablet    Bile duct stenosis       patient needs ERCP done. This will be ordered by Dr. Ana Calloway at the [facility] clinic after the first of the year hopefully         Multiple gastric ulcers - Primary     Unknown if totally healed. She still needs EGD to be done by GI at [facility] clinic. In the meantime continue her PPI and H2 blocker          Relevant Medications    pantoprazole (PROTONIX) 40 MG tablet       Other    History of ventricular tachycardia       no recent episodes. Follow-up with cardiology and continue metoprolol  succinate 25 mg daily         Iron deficiency anemia due to chronic blood loss     Not at goal.  Continue with either Venofer or ferrous at at oncology clinic and monitor her blood counts weekly as per their directions. Cervicalgia       prescription given for her tizanidine to be used as needed         Relevant Medications    tiZANidine (ZANAFLEX) 2 MG tablet        Return in about 29 days (around 1/19/2023), or AWV.        Nikhil Rogers, DO  12/21/2022

## 2022-12-21 NOTE — ASSESSMENT & PLAN NOTE
patient needs ERCP done.   This will be ordered by Dr. Mallorie Morales at the [facility] clinic after the first of the year hopefully

## 2022-12-21 NOTE — ASSESSMENT & PLAN NOTE
Unknown if totally healed. She still needs EGD to be done by GI at [facility].   In the meantime continue her PPI and H2 blocker

## 2023-01-19 ENCOUNTER — OFFICE VISIT (OUTPATIENT)
Dept: PRIMARY CARE CLINIC | Age: 58
End: 2023-01-19

## 2023-01-19 VITALS
HEART RATE: 72 BPM | DIASTOLIC BLOOD PRESSURE: 72 MMHG | WEIGHT: 120 LBS | BODY MASS INDEX: 19.99 KG/M2 | TEMPERATURE: 97.7 F | HEIGHT: 65 IN | SYSTOLIC BLOOD PRESSURE: 110 MMHG | OXYGEN SATURATION: 100 %

## 2023-01-19 DIAGNOSIS — Z00.00 ROUTINE GENERAL MEDICAL EXAMINATION AT A HEALTH CARE FACILITY: ICD-10-CM

## 2023-01-19 DIAGNOSIS — K25.9 MULTIPLE GASTRIC ULCERS: ICD-10-CM

## 2023-01-19 DIAGNOSIS — Z12.31 BREAST CANCER SCREENING BY MAMMOGRAM: ICD-10-CM

## 2023-01-19 DIAGNOSIS — G89.29 OTHER CHRONIC PAIN: ICD-10-CM

## 2023-01-19 DIAGNOSIS — E44.0 MODERATE PROTEIN-CALORIE MALNUTRITION (HCC): ICD-10-CM

## 2023-01-19 DIAGNOSIS — Z00.00 WELCOME TO MEDICARE PREVENTIVE VISIT: Primary | ICD-10-CM

## 2023-01-19 DIAGNOSIS — D84.9 IMMUNOCOMPROMISED STATE (HCC): ICD-10-CM

## 2023-01-19 DIAGNOSIS — K21.9 GASTROESOPHAGEAL REFLUX DISEASE WITHOUT ESOPHAGITIS: ICD-10-CM

## 2023-01-19 DIAGNOSIS — K21.9 GASTROESOPHAGEAL REFLUX DISEASE, UNSPECIFIED WHETHER ESOPHAGITIS PRESENT: ICD-10-CM

## 2023-01-19 DIAGNOSIS — D50.0 IRON DEFICIENCY ANEMIA DUE TO CHRONIC BLOOD LOSS: ICD-10-CM

## 2023-01-19 DIAGNOSIS — Z87.11 HISTORY OF GASTRIC ULCER: ICD-10-CM

## 2023-01-19 DIAGNOSIS — I49.8 OTHER CARDIAC ARRHYTHMIA: ICD-10-CM

## 2023-01-19 PROBLEM — I49.9 ARRHYTHMIA: Status: ACTIVE | Noted: 2023-01-19

## 2023-01-19 RX ORDER — METOPROLOL SUCCINATE 25 MG/1
25 TABLET, EXTENDED RELEASE ORAL DAILY
Qty: 90 TABLET | Refills: 3 | Status: SHIPPED | OUTPATIENT
Start: 2023-01-19

## 2023-01-19 RX ORDER — PANTOPRAZOLE SODIUM 40 MG/1
40 TABLET, DELAYED RELEASE ORAL
Qty: 90 TABLET | Refills: 3 | Status: SHIPPED | OUTPATIENT
Start: 2023-01-19

## 2023-01-19 RX ORDER — FAMOTIDINE 20 MG/1
TABLET, FILM COATED ORAL
Qty: 180 TABLET | Refills: 3 | Status: SHIPPED | OUTPATIENT
Start: 2023-01-19

## 2023-01-19 ASSESSMENT — PATIENT HEALTH QUESTIONNAIRE - PHQ9
2. FEELING DOWN, DEPRESSED OR HOPELESS: 0
SUM OF ALL RESPONSES TO PHQ QUESTIONS 1-9: 0
SUM OF ALL RESPONSES TO PHQ QUESTIONS 1-9: 0
1. LITTLE INTEREST OR PLEASURE IN DOING THINGS: 0
SUM OF ALL RESPONSES TO PHQ9 QUESTIONS 1 & 2: 0
SUM OF ALL RESPONSES TO PHQ QUESTIONS 1-9: 0
SUM OF ALL RESPONSES TO PHQ QUESTIONS 1-9: 0

## 2023-01-19 ASSESSMENT — LIFESTYLE VARIABLES
HOW MANY STANDARD DRINKS CONTAINING ALCOHOL DO YOU HAVE ON A TYPICAL DAY: PATIENT DOES NOT DRINK
HOW OFTEN DO YOU HAVE A DRINK CONTAINING ALCOHOL: NEVER

## 2023-01-19 NOTE — ASSESSMENT & PLAN NOTE
she is very careful to stay away from sick people and uses her mask regularly. She has received her COVID vaccines with exception of her last booster shot.

## 2023-01-19 NOTE — PROGRESS NOTES
Medicare Annual Wellness Visit    Fred Rivera is here for Medicare AWV    Assessment & Plan   Welcome to Medicare preventive visit  Assessment & Plan:    welcome to Medicare physical done today. Snellen eye test as in screenings. She has not fallen. No evidence of cognitive impairment. She is not depressed. Orders:  -     [place] OFFICE OUTPATIENT VISIT 15 MINUTES [28110]  Other cardiac arrhythmia  Assessment & Plan:    continue metoprolol succinate 25 mg. This has effectively suppressed her PVCs. Continue follow-up with cardiology. Orders:  -     metoprolol succinate (TOPROL XL) 25 MG extended release tablet; Take 1 tablet by mouth daily, Disp-90 tablet, R-3Normal  Gastroesophageal reflux disease without esophagitis  Assessment & Plan:    still has episodes of nausea and vomiting which lead to dehydration. She gives herself 1 L of 0.9% normal saline nightly through her port and sometimes more when she is unable to take in fluids and is severely nauseated. She is on pantoprazole and ondansetron along with famotidine 20 mg twice daily. Orders:  -     pantoprazole (PROTONIX) 40 MG tablet; Take 1 tablet by mouth every morning (before breakfast), Disp-90 tablet, R-3Normal  -     famotidine (PEPCID) 20 MG tablet; TAKE ONE TABLET BY MOUTH IN THE MORNING AND 1 TAB BEFORE BEDTIME, Disp-180 tablet, R-3Normal  Multiple gastric ulcers  Assessment & Plan:    unable to evaluate because of presence health state. She is supposed to see Dr. Cristiano Ochoa specialist at [facility] but has been unable to get up to see him for many reasons. 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 Orders:  -     pantoprazole (PROTONIX) 40 MG tablet;  Take 1 tablet by mouth every morning (before breakfast), Disp-90 tablet, R-3Normal  Immunocompromised state [facility])  Assessment & Plan:    she is very careful to stay away from sick people and uses her mask regularly. She has received her COVID vaccines with exception of her last booster shot. Moderate protein-calorie malnutrition ([address]  Assessment & Plan:    is difficult to get her to eat because of her GI problems. She has been trying nutritional supplements which is difficult for her to tolerate at times. We will keep an eye on her serum albumin and her rest of her labs  Breast cancer screening by mammogram  Assessment & Plan:    requisition for mammogram given  Orders:  -     [facility] DIGITAL SCREEN W OR WO CAD BILATERAL; Future  Other chronic pain  Assessment & Plan:    mainly intermittent cervicalgia and severe headaches. She takes tizanidine on a as needed basis. She cannot take NSAID's because of her GI problems. She takes Tylenol on occasion. Routine general medical examination at a health care facility  Iron deficiency anemia due to chronic blood loss  Assessment & Plan:    under the care of hematologist at [facility] who gives her IV iron infusions on a regular basis, checks her CBC and anemia panels. History of gastric ulcer  Assessment & Plan:    he will need an EGD in the future. Hopefully she will get well enough to have this done at [facility] clinic. In the meantime continue her pantoprazole and famotidine  Gastroesophageal reflux disease, unspecified whether esophagitis present  Assessment & Plan:    still has episodes of nausea and vomiting which lead to dehydration. She gives herself 1 L of 0.9% normal saline nightly through her port and sometimes more when she is unable to take in fluids and is severely nauseated. She is on pantoprazole and ondansetron along with famotidine 20 mg twice daily.     Recommendations for Preventive Services Due: see orders and patient instructions/AVS.  Recommended screening schedule for the next 5-10 years is provided to the patient in written form: see Patient Instructions/AVS.     Return in 1 year (on 1/19/2024), or regular office visit in 3 months, for Medicare Annual Wellness Visit in 1 year. Subjective       Patient's complete Health Risk Assessment and screening values have been reviewed and are found in Flowsheets. The following problems were reviewed today and where indicated follow up appointments were made and/or referrals ordered. Please see above assessment and plan    Positive Risk Factor Screenings with Interventions:              Self-assessment of health: In general, how would you say your health is?: (!) Poor    Interventions:  Patient declines any further evaluation or treatment we will continue with hematology treatment and hopefully soon GI evaluation.     General HRA Questions:  Select all that apply: (!) New or Increased Pain, New or Increased Fatigue    Pain Interventions:  Takes Tizanidine for neck and back pain    Fatigue Interventions:  Patient comments: chronic fatigue secondary to severe iron deficiency anemia, Getting Iron infusions at [facility]       Weight and Activity:  Physical Activity: Inactive    Days of Exercise per Week: 0 days    Minutes of Exercise per Session: 0 min     On average, how many days per week do you engage in moderate to strenuous exercise (like a brisk walk)?: 0 days  Have you lost any weight without trying in the past 3 months?: No  Body mass index: 19.97    Inactivity Interventions:  Patient declined any further interventions or treatment Can not do exercise secondary to chronic anemia      Dentist Screen:  Have you seen the dentist within the past year?: (!) No    Intervention:  Advised to schedule with their oral surgeon        Advanced Directives:  Do you have a Living Will?: (!) No    Intervention:  has NO advanced directive - information provided       Will get mammogram at [facility]  Will call Dr Timbo Lyons ( GI ) at [facility] for GI workup  Continue IV Iron Infusions and bloodwork at [facility].  Patient does not want any gynecological examinations or Pap tests.              Objective   Vitals:    01/19/23 1156   BP: 110/72   Pulse: 72   Temp: 97.7 °F (36.5 °C)   SpO2: 100%   Weight: 120 lb (54.4 kg)   Height: 5' 5\" (1.651 m)      Body mass index is 19.97 kg/m².      General Appearance: alert and oriented to person, place and time, well developed and well- nourished, in no acute distress  Skin: warm and dry, no rash or erythema  Head: normocephalic and atraumatic  ENT: tympanic membrane, external ear and ear canal normal bilaterally, nose without deformity, nasal mucosa and turbinates normal without polyps.  Conjunctiva pale  Neck: supple and non-tender without mass, no thyromegaly or thyroid nodules, no cervical lymphadenopathy  Pulmonary/Chest: clear to auscultation bilaterally- no wheezes, rales or rhonchi, normal air movement, no respiratory distress  Cardiovascular: normal rate, regular rhythm, normal S1 and S2, no murmurs, rubs, clicks, or gallops, distal pulses intact, no carotid bruits  Abdomen: soft, non-tender, non-distended, normal bowel sounds, no masses or organomegaly  Extremities: no cyanosis, clubbing or edema  Musculoskeletal: normal range of motion, no joint swelling, deformity or tenderness.  Port site clean on right upper chest.  Neurologic: reflexes normal and symmetric, no cranial nerve deficit, gait, coordination and speech normal       Allergies   Allergen Reactions    Cefepime      Other reaction(s): blood pressure changes    Morphine      rash(morphine)  Other reaction(s): emesis, blood pressure changes ,rash    Vancomycin Rash     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    Cephalosporins Other (See Comments)     Prior to Visit Medications    Medication Sig Taking? Authorizing Provider   metoprolol succinate (TOPROL XL) 25 MG extended release tablet Take 1 tablet by mouth daily Yes Anais Mcgraw,    pantoprazole (PROTONIX) 40 MG tablet  Take 1 tablet by mouth every morning (before breakfast) Yes Anais Mcgraw DO   famotidine (PEPCID) 20 MG tablet TAKE ONE TABLET BY MOUTH IN THE MORNING AND 1 TAB BEFORE BEDTIME Yes Anais Mcgraw DO   Dextrose-Sodium Chloride (DEXTROSE 5 % AND 0.9 % NACL) 5-0.9 % infusion  Yes Historical Provider, MD   tiZANidine (ZANAFLEX) 2 MG tablet Take 1 tablet by mouth 3 times daily as needed (cervicalgia) Yes Anais Mcgraw DO   ondansetron (ZOFRAN-ODT) 4 MG disintegrating tablet Take 1 tablet by mouth 3 times daily as needed for Nausea or Vomiting Yes Anais Mcgraw DO   cyanocobalamin 1000 MCG/ML injection MONTHLY Yes Historical Provider, MD   MULTIPLE VITAMIN IV Infuse intravenously Yes Historical Provider, MD Breen (Including outside providers/suppliers regularly involved in providing care):   Patient Care Team:  Avery Soliman DO as PCP - General (Internal Medicine)  Avery Soliman DO as PCP - [facility] Empaneled Provider     Reviewed and updated this visit:  Tobacco  Allergies  Meds  Problems  Med Hx  Surg Hx  Soc Hx  Fam Hx

## 2023-01-19 NOTE — ASSESSMENT & PLAN NOTE
he will need an EGD in the future. Hopefully she will get well enough to have this done at [facility].   In the meantime continue her pantoprazole and famotidine

## 2023-01-19 NOTE — ASSESSMENT & PLAN NOTE
still has episodes of nausea and vomiting which lead to dehydration. She gives herself 1 L of 0.9% normal saline nightly through her port and sometimes more when she is unable to take in fluids and is severely nauseated. She is on pantoprazole and ondansetron along with famotidine 20 mg twice daily.

## 2023-01-19 NOTE — PATIENT INSTRUCTIONS
Learning About Emotional Support  When do you need emotional support? You might find getting support from others helpful when you have a long-term health problem. Often people feel alone, confused, or scared when coping with an illness. But you aren't alone. Other people are going through the same thing you are and know how you feel. Talking with others about your feelings can help you feel better. Your family and friends can give you support. So can your doctor, a support group, or a Tenriism. If you have a support network, you will not feel as alone. You will learn new ways to deal with your situation, and you may try harder to overcome it. Where you can get support  Family and friends: They can help you cope by giving you comfort and encouragement. Counseling: Professional counseling can help you cope with situations that interfere with your life and cause stress. Counseling can help you understand and deal with your illness. Your doctor: Find a doctor you trust and feel comfortable with. Be open and honest about your fears and concerns. Your doctor can help you get the right medical treatments, including counseling. Spiritual or Taoism groups: They can provide comfort and may be able to help you find counseling or other social support services. Social groups: They can help you meet new people and get involved in activities you enjoy. Community support groups: In a support group, you can talk to others who have dealt with the same problems or illness as you. You can encourage one another and learn ways to cope with tough emotions. How to find a support group  Ask your doctor, counselor, or other health professional for suggestions. Contact your local Tenriism, Adventism, Yazidism, or other Taoism group. Ask your family and friends. Ask people who have the same health concerns. Go online. Forums and blogs let you read messages from others and leave your own messages.  You can exchange stories, vent your frustrations, and ask and answer questions. Contact a city, state, or national group that provides support for your health concerns. Your library or community center may have a list of these groups. Or you can look for information online. Look for a support group that works for you. Ask yourself if you prefer structure and would like a , or if you would like a less formal group. Do you prefer face-to-face meetings? Or do you feel more secure in online chat rooms or forums? Supportive relationships  A supportive relationship includes emotional support such as love, trust, and understanding, as well as advice and concrete help, such as help managing your time. Reach out to others  Family and friends can help you. Ask them to:  Listen to you and give you encouragement. This can keep you from feeling hopeless or alone. Help with small daily tasks or with bigger problems. A helping hand can keep you from feeling overwhelmed. Help you manage a health problem. For example, ask them to go to doctor visits with you. Your loved ones can offer support by being involved in your medical care. Respect your relationships  A good relationship is also a two-way street. You count on help from others, but they also count on you. Know your friends' limits. You don't have to see or call your friends every day. If you are going through a rough patch, ask friends if you can contact them outside of the usual boundaries. Don't always complain or talk about yourself. Know when it's time to stop talking and listen or just enjoy your friend's company. Know that good friends can be a bad influence. For example, if a friend encourages you to drink when you know it will harm you, you may want to end the friendship. Where can you learn more? Go to http://www.woods.com/ and enter G092 to learn more about \"Learning About Emotional Support. \"  Current as of: February 9, 8761               XUQYPDA Version: 13.5  © 4240-5027 [employer]. Care instructions adapted under license by [employer]. If you have questions about a medical condition or this instruction, always ask your healthcare professional. [address] any warranty or liability for your use of this information. Fatigue: Care Instructions  Your Care Instructions     Fatigue is a feeling of tiredness, exhaustion, or lack of energy. You may feel fatigue because of too much or not enough activity. It can also come from stress, lack of sleep, boredom, and poor diet. Many medical problems, such as viral infections, can cause fatigue. Emotional problems, especially depression, are often the cause of fatigue. Fatigue is most often a symptom of another problem. Treatment for fatigue depends on the cause. For example, if you have fatigue because you have a certain health problem, treating this problem also treats your fatigue. If depression or anxiety is the cause, treatment may help. Follow-up care is a key part of your treatment and safety. Be sure to make and go to all appointments, and call your doctor if you are having problems. It's also a good idea to know your test results and keep a list of the medicines you take. How can you care for yourself at home? Get regular exercise. But don't overdo it. Go back and forth between rest and exercise. Get plenty of rest.  Eat a healthy diet. Do not skip meals, especially breakfast.  Reduce your use of caffeine, tobacco, and alcohol. Caffeine is most often found in coffee, tea, cola drinks, and chocolate. Limit medicines that can cause fatigue. This includes tranquilizers and cold and allergy medicines. When should you call for help? Watch closely for changes in your health, and be sure to contact your doctor if:    You have new symptoms such as fever or a rash.     Your fatigue gets worse.     You have been feeling down, depressed, or hopeless.  Or you may have lost interest in things that you usually enjoy.     You are not getting better as expected. Where can you learn more? Go to http://www.woods.com/ and enter U947 to learn more about \"Fatigue: Care Instructions. \"  Current as of: February 9, 2022               Content Version: 13.5  © 0303-3372 Healthwise, [employer]. Care instructions adapted under license by [facility] [place] ([facility]). If you have questions about a medical condition or this instruction, always ask your healthcare professional. [place] [address] any warranty or liability for your use of this information. Advance Directives: Care Instructions  Overview  An advance directive is a legal way to state your wishes at the end of your life. It tells your family and your doctor what to do if you can't say what you want. There are two main types of advance directives. You can change them any time your wishes change. Living will. This form tells your family and your doctor your wishes about life support and other treatment. The form is also called a declaration. Medical power of . This form lets you name a person to make treatment decisions for you when you can't speak for yourself. This person is called a health care agent (health care proxy, health care surrogate). The form is also called a durable power of  for health care. If you do not have an advance directive, decisions about your medical care may be made by a family member, or by a doctor or a  who doesn't know you. It may help to think of an advance directive as a gift to the people who care for you. If you have one, they won't have to make tough decisions by themselves. For more information, including forms for your state, see the [address] website (www.caringinfo.org/planning/advance-directives/). Follow-up care is a key part of your treatment and safety.  Be sure to make and go to all appointments, and call your doctor if you are having problems. It's also a good idea to know your test results and keep a list of the medicines you take. What should you include in an advance directive? Many states have a unique advance directive form. (It may ask you to address specific issues.) Or you might use a universal form that's approved by many states. If your form doesn't tell you what to address, it may be hard to know what to include in your advance directive. Use the questions below to help you get started. Who do you want to make decisions about your medical care if you are not able to? What life-support measures do you want if you have a serious illness that gets worse over time or can't be cured? What are you most afraid of that might happen? (Maybe you're afraid of having pain, losing your independence, or being kept alive by machines.)  Where would you prefer to die? (Your home? A hospital? A nursing home?)  Do you want to donate your organs when you die? Do you want certain Shinto practices performed before you die? When should you call for help? Be sure to contact your doctor if you have any questions. Where can you learn more? Go to http://www.pena.com/ and enter R264 to learn more about \"Advance Directives: Care Instructions. \"  Current as of: June 16, 2022               Content Version: 13.5  © 1390-8399 Healthwise, Incorporated. Care instructions adapted under license by [facility] ([facility]). If you have questions about a medical condition or this instruction, always ask your healthcare professional. [place] [address] any warranty or liability for your use of this information. Personalized Preventive Plan for [address] [place] [address] - 1/19/2023  Medicare offers a range of preventive health benefits. Some of the tests and screenings are paid in full while other may be subject to a deductible, co-insurance, and/or copay.     Some of these benefits include a comprehensive review of your medical history including lifestyle, illnesses that may run in your family, and various assessments and screenings as appropriate. After reviewing your medical record and screening and assessments performed today your provider may have ordered immunizations, labs, imaging, and/or referrals for you. A list of these orders (if applicable) as well as your Preventive Care list are included within your After Visit Summary for your review. Other Preventive Recommendations:    A preventive eye exam performed by an eye specialist is recommended every 1-2 years to screen for glaucoma; cataracts, macular degeneration, and other eye disorders. A preventive dental visit is recommended every 6 months. Try to get at least 150 minutes of exercise per week or 10,000 steps per day on a pedometer . Order or download the FREE \"Exercise & Physical Activity: Your Everyday Guide\" from [employer]. Call 1-679.307.3224 or search [employer] online. You need 1066-5118 mg of calcium and 2043-8918 IU of vitamin D per day. It is possible to meet your calcium requirement with diet alone, but a vitamin D supplement is usually necessary to meet this goal.  When exposed to the sun, use a sunscreen that protects against both UVA and UVB radiation with an SPF of 30 or greater. Reapply every 2 to 3 hours or after sweating, drying off with a towel, or swimming. Always wear a seat belt when traveling in a car. Always wear a helmet when riding a bicycle or motorcycle.

## 2023-01-19 NOTE — ASSESSMENT & PLAN NOTE
is difficult to get her to eat because of her GI problems. She has been trying nutritional supplements which is difficult for her to tolerate at times.   We will keep an eye on her serum albumin and her rest of her labs

## 2023-01-19 NOTE — ASSESSMENT & PLAN NOTE
welcome to Medicare physical done today. Snellen eye test as in screenings. She has not fallen. No evidence of cognitive impairment. She is not depressed.

## 2023-01-19 NOTE — ASSESSMENT & PLAN NOTE
mainly intermittent cervicalgia and severe headaches. She takes tizanidine on a as needed basis. She cannot take NSAID's because of her GI problems. She takes Tylenol on occasion.

## 2023-01-19 NOTE — ASSESSMENT & PLAN NOTE
under the care of hematologist at [facility] who gives her IV iron infusions on a regular basis, checks her CBC and anemia panels.

## 2023-01-19 NOTE — ASSESSMENT & PLAN NOTE
continue metoprolol succinate 25 mg. This has effectively suppressed her PVCs. Continue follow-up with cardiology.

## 2023-01-19 NOTE — ASSESSMENT & PLAN NOTE
unable to evaluate because of presence health state. She is supposed to see Dr. Mumtaz Howell specialist at [facility] but has been unable to get up to see him for many reasons. 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

## 2023-02-18 PROBLEM — Z00.00 WELCOME TO MEDICARE PREVENTIVE VISIT: Status: RESOLVED | Noted: 2023-01-19 | Resolved: 2023-02-18

## 2023-02-18 PROBLEM — Z00.00 ROUTINE GENERAL MEDICAL EXAMINATION AT A HEALTH CARE FACILITY: Status: RESOLVED | Noted: 2023-01-19 | Resolved: 2023-02-18

## 2023-02-20 DIAGNOSIS — N20.0 KIDNEY STONES: ICD-10-CM

## 2023-02-20 DIAGNOSIS — N23 RENAL COLIC: Primary | ICD-10-CM

## 2023-02-20 RX ORDER — HYDROCODONE BITARTRATE AND ACETAMINOPHEN 5; 325 MG/1; MG/1
1 TABLET ORAL EVERY 6 HOURS PRN
Qty: 28 TABLET | Refills: 0 | Status: SHIPPED | OUTPATIENT
Start: 2023-02-20 | End: 2023-02-27

## 2023-02-20 NOTE — PROGRESS NOTES
Patient called. Has renal colic with flank pain, nausea, difficult to drink fluids. Has a history of kidney stones and has seen urologist about this. She is attempting to pass kidney stones. Patient this is happened before. She tried tramadol which did not help with her give her a trial of Norco for 2 or 3 days and if she is no better she comes and sees me or see his urologist.  Prescription sent .  OARRS report reviewed and she is on no problematic medications

## 2023-02-28 ENCOUNTER — TELEPHONE (OUTPATIENT)
Dept: PRIMARY CARE CLINIC | Age: 58
End: 2023-02-28

## 2023-02-28 DIAGNOSIS — R50.9 ACUTE FEBRILE ILLNESS: Primary | ICD-10-CM

## 2023-02-28 NOTE — TELEPHONE ENCOUNTER
Patient called. Last night she had a  temp of 101.7. Took pyretics. She has a history of immunodeficiency with low WBCs. History of chronic iron deficiency anemia. Hemoglobin yesterday was actually not bad for her at 6.1. She is concerned about an underlying infection. She has a port and did not give herself fluids last night because she was afraid the port may be infected. We will obtain a stat WBC with manual differential and a sed rate at [facility]. We will look for any signs of infection.

## 2023-04-20 ENCOUNTER — OFFICE VISIT (OUTPATIENT)
Dept: PRIMARY CARE CLINIC | Age: 58
End: 2023-04-20

## 2023-04-20 VITALS
TEMPERATURE: 98.5 F | OXYGEN SATURATION: 100 % | HEIGHT: 65 IN | SYSTOLIC BLOOD PRESSURE: 112 MMHG | WEIGHT: 121 LBS | BODY MASS INDEX: 20.16 KG/M2 | HEART RATE: 78 BPM | DIASTOLIC BLOOD PRESSURE: 60 MMHG

## 2023-04-20 DIAGNOSIS — Z86.79 HISTORY OF VENTRICULAR TACHYCARDIA: ICD-10-CM

## 2023-04-20 DIAGNOSIS — K25.9 MULTIPLE GASTRIC ULCERS: ICD-10-CM

## 2023-04-20 DIAGNOSIS — D50.0 IRON DEFICIENCY ANEMIA DUE TO CHRONIC BLOOD LOSS: ICD-10-CM

## 2023-04-20 DIAGNOSIS — N20.0 KIDNEY STONES: ICD-10-CM

## 2023-04-20 DIAGNOSIS — E44.0 MODERATE PROTEIN-CALORIE MALNUTRITION (HCC): ICD-10-CM

## 2023-04-20 DIAGNOSIS — M54.2 CERVICALGIA: Primary | ICD-10-CM

## 2023-04-20 DIAGNOSIS — K21.00 GASTROESOPHAGEAL REFLUX DISEASE WITH ESOPHAGITIS, UNSPECIFIED WHETHER HEMORRHAGE: ICD-10-CM

## 2023-04-20 PROBLEM — I47.20 VENTRICULAR TACHYCARDIA (HCC): Status: RESOLVED | Noted: 2023-04-20 | Resolved: 2023-04-20

## 2023-04-20 PROBLEM — I47.20 VENTRICULAR TACHYCARDIA (HCC): Status: ACTIVE | Noted: 2023-04-20

## 2023-04-20 PROBLEM — Z86.718 HISTORY OF DVT (DEEP VEIN THROMBOSIS): Status: ACTIVE | Noted: 2023-04-20

## 2023-04-20 RX ORDER — TIZANIDINE 2 MG/1
2 TABLET ORAL 3 TIMES DAILY PRN
Qty: 90 TABLET | Refills: 2 | Status: SHIPPED | OUTPATIENT
Start: 2023-04-20

## 2023-04-20 SDOH — ECONOMIC STABILITY: FOOD INSECURITY: WITHIN THE PAST 12 MONTHS, YOU WORRIED THAT YOUR FOOD WOULD RUN OUT BEFORE YOU GOT MONEY TO BUY MORE.: NEVER TRUE

## 2023-04-20 SDOH — ECONOMIC STABILITY: FOOD INSECURITY: WITHIN THE PAST 12 MONTHS, THE FOOD YOU BOUGHT JUST DIDN'T LAST AND YOU DIDN'T HAVE MONEY TO GET MORE.: NEVER TRUE

## 2023-04-20 SDOH — ECONOMIC STABILITY: HOUSING INSECURITY
IN THE LAST 12 MONTHS, WAS THERE A TIME WHEN YOU DID NOT HAVE A STEADY PLACE TO SLEEP OR SLEPT IN A SHELTER (INCLUDING NOW)?: NO

## 2023-04-20 SDOH — ECONOMIC STABILITY: INCOME INSECURITY: HOW HARD IS IT FOR YOU TO PAY FOR THE VERY BASICS LIKE FOOD, HOUSING, MEDICAL CARE, AND HEATING?: NOT HARD AT ALL

## 2023-04-20 ASSESSMENT — ENCOUNTER SYMPTOMS
SHORTNESS OF BREATH: 0
EYE DISCHARGE: 0
NAUSEA: 1
CONSTIPATION: 0
BLOOD IN STOOL: 0
WHEEZING: 0
VOMITING: 0
COUGH: 0
SORE THROAT: 0
SINUS PAIN: 0
ABDOMINAL PAIN: 0

## 2023-04-20 NOTE — PROGRESS NOTES
metoprolol succinate 25 mg a day as it appears to be suppressing her ventricular tachycardia. Follow-up with cardiology as directed         Relevant Medications    Handicap Placard MISC    Iron deficiency anemia due to chronic blood loss       fear iron deficiency anemia. Hemoglobin ranges from 5 to 6 g. This is her baseline. She continues on her IV iron weekly and follows up with oncology nurse practitioner         Relevant Medications    Handicap Placard MISC    Cervicalgia - Primary       use her tizanidine as needed. .  Use Tylenol as needed         Relevant Medications    tiZANidine (ZANAFLEX) 2 MG tablet        Return in about 3 months (around 7/20/2023), or lumbar pain, anemia, cardiac arrythmia.        Jed Ortiz, DO  4/20/2023

## 2023-05-03 DIAGNOSIS — R50.9 FEBRILE ILLNESS: Primary | ICD-10-CM

## 2023-05-04 LAB
C-REACTIVE PROTEIN: 1.2 MG/L
SEDIMENTATION RATE, ERYTHROCYTE: 8 MM/HR (ref 0–30)
WBC # BLD: 2.8 K/UL (ref 4.5–11)

## 2023-05-18 ENCOUNTER — OFFICE VISIT (OUTPATIENT)
Dept: PRIMARY CARE CLINIC | Age: 58
End: 2023-05-18

## 2023-05-18 VITALS
TEMPERATURE: 98.9 F | SYSTOLIC BLOOD PRESSURE: 134 MMHG | BODY MASS INDEX: 19.99 KG/M2 | DIASTOLIC BLOOD PRESSURE: 72 MMHG | HEART RATE: 124 BPM | WEIGHT: 120 LBS | HEIGHT: 65 IN | OXYGEN SATURATION: 99 %

## 2023-05-18 DIAGNOSIS — D84.9 IMMUNOCOMPROMISED STATE (HCC): ICD-10-CM

## 2023-05-18 DIAGNOSIS — T73.1XXA: ICD-10-CM

## 2023-05-18 DIAGNOSIS — K29.70 VIRAL GASTRITIS: Primary | ICD-10-CM

## 2023-05-18 DIAGNOSIS — R50.81 FEVER IN OTHER DISEASES: ICD-10-CM

## 2023-05-18 DIAGNOSIS — M79.10 MYALGIA: ICD-10-CM

## 2023-05-18 LAB
INFLUENZA A ANTIBODY: NEGATIVE
INFLUENZA B ANTIBODY: NEGATIVE

## 2023-05-18 ASSESSMENT — ENCOUNTER SYMPTOMS
SORE THROAT: 0
ABDOMINAL PAIN: 0
NAUSEA: 1
VOMITING: 0
DIARRHEA: 0

## 2023-05-18 NOTE — ASSESSMENT & PLAN NOTE
encouraged small sips of electrolyte-containing solutions such as Pedialyte or diluted Gatorade. Use Zofran to control her nausea. Start with a saltine crackers and increase her diet carefully but stay on a bland diet. Give herself an extra liter of 0.9% saline tonight in addition to her regular 1 L bottle. Do this for 1 more day. If she gets worse she is to go to the ED. If she is unable to keep any liquids down she has to go to the ED.

## 2023-05-18 NOTE — PATIENT INSTRUCTIONS
Extra liter of IV fluids today and tomorrow  Use Zofran for nausea, Tizanidine for muscle pain, Tylenol for fever  Continue Oantoprazole and Famotidine  Hydrate orally, [place] diet

## 2023-05-18 NOTE — ASSESSMENT & PLAN NOTE
Tylenol as needed for headache and fever.   Try and avoid NSAIDs in view of her history of GI bleed and gastric ulcers

## 2023-05-18 NOTE — PROGRESS NOTES
2023    Name: Liz Anders : 1965 Sex: female  Age: 62 y.o. Subjective:  Chief Complaint   Patient presents with    Other     Febrile illness, dehydration        HPI    Over the weekend patient was at 3 events with a lot of people, her niece graduated from  radiology  tech school, another niece had her confirmation and had a large family gathering to celebrate both events. Her younger niece started over the weekend with nausea, vomiting, diarrhea and a fever. She was clinically dehydrated, went to the ED and was given 2 L of IV fluid. Yesterday patient started with similar symptoms. Worsened today. Temperature up to 101.4, nausea but no vomiting. She is unable to eat anything except a few crackers. She has an port where she gives herself IV fluids every night, 1000 cc of normal saline. Immunocompromised state, severe iron deficiency anemia and receives IV iron. Concerned about an infection of her port as she had DVT of her vein last August.    Review of Systems   Constitutional:  Positive for fatigue. Negative for chills, diaphoresis, fever and unexpected weight change. HENT:  Negative for congestion, ear pain and sore throat. Gastrointestinal:  Positive for nausea. Negative for abdominal pain, diarrhea and vomiting. Musculoskeletal:  Positive for arthralgias and myalgias. Neurological:  Positive for weakness and headaches.         Current Outpatient Medications:     tiZANidine (ZANAFLEX) 2 MG tablet, Take 1 tablet by mouth 3 times daily as needed (cervicalgia), Disp: 90 tablet, Rfl: 2    Handicap Placard MISC, by Does not apply route Duration 5 years, Disp: 1 each, Rfl: 0    metoprolol succinate (TOPROL XL) 25 MG extended release tablet, Take 1 tablet by mouth daily, Disp: 90 tablet, Rfl: 3    pantoprazole (PROTONIX) 40 MG tablet, Take 1 tablet by mouth every morning (before breakfast), Disp: 90 tablet, Rfl: 3    famotidine (PEPCID) 20 MG tablet, TAKE ONE TABLET BY MOUTH IN THE

## 2023-05-18 NOTE — ASSESSMENT & PLAN NOTE
conservative therapy with IV hydration, small sips of electrolyte laden fluids. Tylenol, IV Zofran given, if worse go to the ED. Continue her pantoprazole and famotidine as directed.

## 2023-07-17 DIAGNOSIS — M54.2 CERVICALGIA: ICD-10-CM

## 2023-07-17 RX ORDER — TIZANIDINE 2 MG/1
TABLET ORAL
Qty: 90 TABLET | Refills: 0 | Status: SHIPPED | OUTPATIENT
Start: 2023-07-17

## 2023-07-17 NOTE — TELEPHONE ENCOUNTER
Last Appointment:  5/18/2023  Future Appointments   Date Time Provider [address]   7/19/2023  9:00 AM [address]

## 2023-07-19 ENCOUNTER — OFFICE VISIT (OUTPATIENT)
Dept: PRIMARY CARE CLINIC | Age: 58
End: 2023-07-19
Payer: MEDICARE

## 2023-07-19 VITALS
SYSTOLIC BLOOD PRESSURE: 120 MMHG | TEMPERATURE: 97.5 F | WEIGHT: 123 LBS | HEART RATE: 83 BPM | DIASTOLIC BLOOD PRESSURE: 68 MMHG | BODY MASS INDEX: 20.47 KG/M2

## 2023-07-19 DIAGNOSIS — E44.0 MODERATE PROTEIN-CALORIE MALNUTRITION (HCC): ICD-10-CM

## 2023-07-19 DIAGNOSIS — D50.0 IRON DEFICIENCY ANEMIA DUE TO CHRONIC BLOOD LOSS: ICD-10-CM

## 2023-07-19 DIAGNOSIS — K90.49 MALABSORPTION DUE TO INTOLERANCE, NOT ELSEWHERE CLASSIFIED: ICD-10-CM

## 2023-07-19 DIAGNOSIS — N20.0 KIDNEY STONES: ICD-10-CM

## 2023-07-19 DIAGNOSIS — K21.00 GASTROESOPHAGEAL REFLUX DISEASE WITH ESOPHAGITIS, UNSPECIFIED WHETHER HEMORRHAGE: Primary | ICD-10-CM

## 2023-07-19 DIAGNOSIS — K25.9 MULTIPLE GASTRIC ULCERS: ICD-10-CM

## 2023-07-19 DIAGNOSIS — D70.8 OTHER NEUTROPENIA (HCC): ICD-10-CM

## 2023-07-19 DIAGNOSIS — R77.1 HYPOGLOBULINEMIA: ICD-10-CM

## 2023-07-19 PROBLEM — K29.70 VIRAL GASTRITIS: Status: RESOLVED | Noted: 2023-05-18 | Resolved: 2023-07-19

## 2023-07-19 PROCEDURE — 99213 OFFICE O/P EST LOW 20 MIN: CPT | Performed by: INTERNAL MEDICINE

## 2023-07-19 PROCEDURE — 3017F COLORECTAL CA SCREEN DOC REV: CPT | Performed by: INTERNAL MEDICINE

## 2023-07-19 PROCEDURE — 1036F TOBACCO NON-USER: CPT | Performed by: INTERNAL MEDICINE

## 2023-07-19 PROCEDURE — G8420 CALC BMI NORM PARAMETERS: HCPCS | Performed by: INTERNAL MEDICINE

## 2023-07-19 PROCEDURE — G8427 DOCREV CUR MEDS BY ELIG CLIN: HCPCS | Performed by: INTERNAL MEDICINE

## 2023-07-19 ASSESSMENT — ENCOUNTER SYMPTOMS
SINUS PAIN: 0
NAUSEA: 1
VOMITING: 0
WHEEZING: 0
CONSTIPATION: 0
EYE DISCHARGE: 0
SHORTNESS OF BREATH: 0
COUGH: 0
ABDOMINAL PAIN: 0
BLOOD IN STOOL: 0
SORE THROAT: 0

## 2023-07-19 NOTE — ASSESSMENT & PLAN NOTE
continue double therapy.   If she ever gets well enough to have an EGD she would like it done up at [facility] by her previous GI doctor

## 2023-07-19 NOTE — ASSESSMENT & PLAN NOTE
she would definitely benefit from IgG infusions however patient tells me  her insurance company refuses to pay for them

## 2023-07-19 NOTE — ASSESSMENT & PLAN NOTE
probably part of her myelodysplastic syndrome.   Her platelet count is the only 1 that is in normal range

## 2023-07-19 NOTE — ASSESSMENT & PLAN NOTE
can consider referral to urology for further follow-up of stones and/or referral to endocrine for metabolic work-up regarding her recurrent kidney stones

## 2023-07-19 NOTE — ASSESSMENT & PLAN NOTE
Continue weekly IV iron infusions and continue weekly blood work per hematology/oncology at [facility]

## 2023-07-19 NOTE — ASSESSMENT & PLAN NOTE
patient unable to absorb iron orally. She also has difficulty absorbing other minerals and vitamins because of her GI problems. She gets a multivitamin and her IV once a day.   She gets iron infusions once a week

## 2023-08-03 ENCOUNTER — OFFICE VISIT (OUTPATIENT)
Dept: PRIMARY CARE CLINIC | Age: 58
End: 2023-08-03
Payer: MEDICARE

## 2023-08-03 VITALS
TEMPERATURE: 98.6 F | SYSTOLIC BLOOD PRESSURE: 118 MMHG | HEIGHT: 65 IN | HEART RATE: 104 BPM | WEIGHT: 123 LBS | OXYGEN SATURATION: 99 % | BODY MASS INDEX: 20.49 KG/M2 | DIASTOLIC BLOOD PRESSURE: 74 MMHG

## 2023-08-03 DIAGNOSIS — H69.81 DYSFUNCTION OF RIGHT EUSTACHIAN TUBE: ICD-10-CM

## 2023-08-03 DIAGNOSIS — H60.551 ACUTE REACTIVE OTITIS EXTERNA OF RIGHT EAR: Primary | ICD-10-CM

## 2023-08-03 PROBLEM — H69.91 DYSFUNCTION OF RIGHT EUSTACHIAN TUBE: Status: ACTIVE | Noted: 2023-08-03

## 2023-08-03 PROCEDURE — G8427 DOCREV CUR MEDS BY ELIG CLIN: HCPCS | Performed by: INTERNAL MEDICINE

## 2023-08-03 PROCEDURE — 3017F COLORECTAL CA SCREEN DOC REV: CPT | Performed by: INTERNAL MEDICINE

## 2023-08-03 PROCEDURE — 99213 OFFICE O/P EST LOW 20 MIN: CPT | Performed by: INTERNAL MEDICINE

## 2023-08-03 PROCEDURE — G8420 CALC BMI NORM PARAMETERS: HCPCS | Performed by: INTERNAL MEDICINE

## 2023-08-03 PROCEDURE — 1036F TOBACCO NON-USER: CPT | Performed by: INTERNAL MEDICINE

## 2023-08-03 PROCEDURE — 4130F TOPICAL PREP RX AOE: CPT | Performed by: INTERNAL MEDICINE

## 2023-08-03 RX ORDER — HYDROCORTISONE AND ACETIC ACID 20.75; 10.375 MG/ML; MG/ML
3 SOLUTION AURICULAR (OTIC) 3 TIMES DAILY
Qty: 10 ML | Refills: 1 | Status: SHIPPED | OUTPATIENT
Start: 2023-08-03 | End: 2023-08-13

## 2023-08-03 ASSESSMENT — ENCOUNTER SYMPTOMS
SORE THROAT: 1
SHORTNESS OF BREATH: 0
CHEST TIGHTNESS: 0

## 2023-08-03 NOTE — ASSESSMENT & PLAN NOTE
she could try Allegra-D or Claritin-D or even Flonase nasal spray to help with eustachian tube dysfunction.

## 2023-08-03 NOTE — PROGRESS NOTES
8/3/2023    Name: Kai Mathias : 1965 Sex: female  Age: 62 y.o. Subjective:  Chief Complaint   Patient presents with    Other     Right otalgia and drainage        HPI    On Monday, patient noticed that her hearing was muffled on the right and her ear felt plugged. She put 2 drops of Debrox in her ear and later that evening noticed blood coming from her ear. She eventually stopped bleeding but she still has clear drainage. She still notices her hearing is muffled on the right. Her throat is scratchy and she has pain in her right ear and the right side of her neck. She she has no fever, chills or sweats. She has a history of severe iron deficiency anemia. Followed by hematology. Her last hemoglobin was 5.9 and hematocrit of 20.1. Anemia panel showed iron deficiency. She gets intravenous iron at oncology. She has a port. She has not been able to see her gastroenterologist at the [facility] clinic she has never been well enough to have an EGD. Review of Systems   Constitutional:  Negative for chills, diaphoresis, fatigue and fever. HENT:  Positive for congestion, ear discharge, ear pain and sore throat. Negative for hearing loss and tinnitus. Respiratory:  Negative for chest tightness and shortness of breath. Cardiovascular:  Negative for chest pain, palpitations and leg swelling. Hematological:  Does not bruise/bleed easily.         Current Outpatient Medications:     acetic acid-hydrocortisone (VOSOL-HC) 1-2 % otic solution, Place 3 drops into the right ear 3 times daily for 10 days, Disp: 10 mL, Rfl: 1    tiZANidine (ZANAFLEX) 2 MG tablet, TAKE ONE TABLET BY MOUTH THREE TIMES A DAY AS NEEDED (cervicalgia), Disp: 90 tablet, Rfl: 0    Handicap Placard MISC, by Does not apply route Duration 5 years, Disp: 1 each, Rfl: 0    metoprolol succinate (TOPROL XL) 25 MG extended release tablet, Take 1 tablet by mouth daily, Disp: 90 tablet, Rfl: 3    pantoprazole (PROTONIX) 40 MG tablet,

## 2023-08-03 NOTE — ASSESSMENT & PLAN NOTE
prescription for VoSol HC 3 drops right ear 3 times a day for 10 days.   If no improvement refer to ENT

## 2023-08-05 ENCOUNTER — TELEPHONE (OUTPATIENT)
Dept: PRIMARY CARE CLINIC | Age: 58
End: 2023-08-05

## 2023-08-05 RX ORDER — AMOXICILLIN 500 MG/1
500 CAPSULE ORAL 2 TIMES DAILY
Qty: 20 CAPSULE | Refills: 0 | Status: SHIPPED | OUTPATIENT
Start: 2023-08-05 | End: 2023-08-15

## 2023-08-07 NOTE — TELEPHONE ENCOUNTER
Crown fell out. Patient unable to find a dentist till this week. Has supports Amoxil given to try and prevent any infection.

## 2023-09-18 ENCOUNTER — OFFICE VISIT (OUTPATIENT)
Dept: PRIMARY CARE CLINIC | Age: 58
End: 2023-09-18
Payer: MEDICARE

## 2023-09-18 VITALS
HEIGHT: 65 IN | TEMPERATURE: 98.8 F | SYSTOLIC BLOOD PRESSURE: 136 MMHG | OXYGEN SATURATION: 98 % | DIASTOLIC BLOOD PRESSURE: 88 MMHG | BODY MASS INDEX: 20.66 KG/M2 | HEART RATE: 104 BPM | WEIGHT: 124 LBS

## 2023-09-18 DIAGNOSIS — K21.00 GASTROESOPHAGEAL REFLUX DISEASE WITH ESOPHAGITIS, UNSPECIFIED WHETHER HEMORRHAGE: Primary | ICD-10-CM

## 2023-09-18 DIAGNOSIS — M54.2 CERVICALGIA: ICD-10-CM

## 2023-09-18 DIAGNOSIS — K90.49 MALABSORPTION DUE TO INTOLERANCE, NOT ELSEWHERE CLASSIFIED: ICD-10-CM

## 2023-09-18 DIAGNOSIS — Z87.11 HISTORY OF GASTRIC ULCER: ICD-10-CM

## 2023-09-18 DIAGNOSIS — Z79.2 NEED FOR PROPHYLACTIC ANTIBIOTIC: ICD-10-CM

## 2023-09-18 DIAGNOSIS — D72.818 OTHER DECREASED WHITE BLOOD CELL (WBC) COUNT: ICD-10-CM

## 2023-09-18 DIAGNOSIS — Z86.79 HISTORY OF VENTRICULAR TACHYCARDIA: ICD-10-CM

## 2023-09-18 PROBLEM — H69.81 DYSFUNCTION OF RIGHT EUSTACHIAN TUBE: Status: RESOLVED | Noted: 2023-08-03 | Resolved: 2023-09-18

## 2023-09-18 PROBLEM — H69.91 DYSFUNCTION OF RIGHT EUSTACHIAN TUBE: Status: RESOLVED | Noted: 2023-08-03 | Resolved: 2023-09-18

## 2023-09-18 PROBLEM — K25.9 MULTIPLE GASTRIC ULCERS: Status: RESOLVED | Noted: 2019-07-10 | Resolved: 2023-09-18

## 2023-09-18 PROBLEM — H60.551 ACUTE REACTIVE OTITIS EXTERNA OF RIGHT EAR: Status: RESOLVED | Noted: 2023-08-03 | Resolved: 2023-09-18

## 2023-09-18 PROCEDURE — G8427 DOCREV CUR MEDS BY ELIG CLIN: HCPCS | Performed by: INTERNAL MEDICINE

## 2023-09-18 PROCEDURE — 99213 OFFICE O/P EST LOW 20 MIN: CPT | Performed by: INTERNAL MEDICINE

## 2023-09-18 PROCEDURE — 3017F COLORECTAL CA SCREEN DOC REV: CPT | Performed by: INTERNAL MEDICINE

## 2023-09-18 PROCEDURE — G8420 CALC BMI NORM PARAMETERS: HCPCS | Performed by: INTERNAL MEDICINE

## 2023-09-18 PROCEDURE — 1036F TOBACCO NON-USER: CPT | Performed by: INTERNAL MEDICINE

## 2023-09-18 RX ORDER — TIZANIDINE 2 MG/1
TABLET ORAL
Qty: 90 TABLET | Refills: 4 | Status: SHIPPED | OUTPATIENT
Start: 2023-09-18

## 2023-09-18 RX ORDER — AMOXICILLIN 500 MG/1
CAPSULE ORAL
Qty: 4 CAPSULE | Refills: 5 | Status: SHIPPED | OUTPATIENT
Start: 2023-09-18

## 2023-09-18 ASSESSMENT — ENCOUNTER SYMPTOMS
COUGH: 0
RHINORRHEA: 1
ABDOMINAL PAIN: 0
CONSTIPATION: 0
NAUSEA: 1
EYE DISCHARGE: 0
WHEEZING: 0
SHORTNESS OF BREATH: 0
SINUS PAIN: 0
VOMITING: 0
SORE THROAT: 0
BLOOD IN STOOL: 0

## 2023-09-18 NOTE — PROGRESS NOTES
2023    Name: Serena Hou : 1965 Sex: female  Age: 62 y.o. Subjective:  Chief Complaint   Patient presents with    Other     GERD  Anemia          HPI         She has known iron deficiency anemia under the care of oncology. .  She underwent a stress test which was negative for ischemia. Dr. Bronson Smith placed a  port on the right. .  The port is larger than her last one and she has been having a few problems with it jutting  out of her chest.    She has known chronic GI bleed. She has not been able to keep appointments with GI doctor in [place] because of her recurrent infections and some insurance problems. She has not had any recent colonoscopies or endoscopies  to evaluate for her GI blood loss. She also has known biliary stenosis with recurrent nausea and vomiting from this problem. Her hemoglobin runs around 5 and she has microcytic hypochromic indices. Weekly she was getting Ferrlecit but the supply is low so they switch her over to Venofer which has caused some muscle spasms and aches. She follows up with nurse practitioner in oncology on a weekly basis. Globin today was 5.5 g with hematocrit of 18.8%. Which has really not changed from a week ago. Ferritin level had improved to 20. The time before she had missed an iron injection and that is why ferritin level was lower. She is on metoprolol succinate 25 mg daily and pantoprazole 40 mg daily. She is on tizanidine as needed neck or back pain and ondansetron as needed nausea. She is on vitamin B12 1000 mcg monthly injection she gives herself IV fluids 1 L along with multivitamins because of her tendency to dehydration and her malabsorption syndrome. Her weight is still low at 124 but stable. Oncology told her not to get the Shingrix shot. She defers getting the COVID-19 booster for now.   She needs her flu shot    We talked about her Pap   she has not had one done in a while and his not willing to have one done

## 2023-09-18 NOTE — ASSESSMENT & PLAN NOTE
continue pantoprazole. Continue famotidine. She has not had a chance to see her GI doctor up at [facility] clinic. Asked her to consider seeing a GI doctor here in town.   She will think about this

## 2023-11-14 ENCOUNTER — TELEPHONE (OUTPATIENT)
Dept: PRIMARY CARE CLINIC | Age: 58
End: 2023-11-14

## 2023-11-14 NOTE — TELEPHONE ENCOUNTER
Patient has called twice today to speak with me about your correction. She is trying to decide who to go to when your retire and asked if she could make an appt with you soon to discuss this. I advised that you are booked until the last few days that you are here, and she already has an appt on 12/18 with you. She said she will just discuss it with you at that appt. She then called back to report that she had her flu shot at [facility] and wanted to make sure we knew that. And then asked if she could schedule another appt with you on one of your last days. I advised that would only be 2 weeks after her appt and I did not know if that would be appropriate. I told her that I would ask. I just didn't want to take up an appt that someone else may need that would want to see you before you retire as you only have a few appts left open.

## 2023-12-18 PROBLEM — I49.9 ARRHYTHMIA: Status: ACTIVE | Noted: 2023-12-18

## 2023-12-18 PROBLEM — H10.9 CONJUNCTIVITIS OF RIGHT EYE: Status: ACTIVE | Noted: 2023-12-18

## 2024-08-20 ENCOUNTER — OFFICE VISIT (OUTPATIENT)
Dept: VASCULAR SURGERY | Age: 59
End: 2024-08-20
Payer: MEDICARE

## 2024-08-20 VITALS — BODY MASS INDEX: 19.8 KG/M2 | WEIGHT: 119 LBS

## 2024-08-20 DIAGNOSIS — I87.8 POOR VENOUS ACCESS: Primary | ICD-10-CM

## 2024-08-20 DIAGNOSIS — K90.9 INTESTINAL MALABSORPTION, UNSPECIFIED TYPE: ICD-10-CM

## 2024-08-20 PROCEDURE — 99203 OFFICE O/P NEW LOW 30 MIN: CPT | Performed by: SURGERY

## 2024-08-20 PROCEDURE — G8420 CALC BMI NORM PARAMETERS: HCPCS | Performed by: SURGERY

## 2024-08-20 PROCEDURE — G8427 DOCREV CUR MEDS BY ELIG CLIN: HCPCS | Performed by: SURGERY

## 2024-08-20 PROCEDURE — 3017F COLORECTAL CA SCREEN DOC REV: CPT | Performed by: SURGERY

## 2024-08-20 PROCEDURE — 1036F TOBACCO NON-USER: CPT | Performed by: SURGERY

## 2024-08-20 NOTE — PROGRESS NOTES
Vascular Surgery Outpatient Consultation      Chief Complaint   Patient presents with    Consultation     New pt. Port not working Schmetterer placed on 6-13-24       Reason for Consult: Venous access    Requesting Physician:  Dr. Mcgraw    HISTORY OF PRESENT ILLNESS:                The patient is a 59 y.o. female who is referred for evaluation of poor venous access.  The patient has a history of mild absorption requiring every other day saline infusion and weekly iron infusions.  She has had multiple ports and port infections.  Most recently, a right femoral port was inserted.  The patient states that her port is usually accessed weekly and she leaves the Ortega needle in place for the week.  She does her infusions at home.  She states that she is vigilant with sterility.    Past Medical History:        Diagnosis Date    Abdominal pain, epigastric 06/11/2004    Acute bacterial conjunctivitis of both eyes 04/27/2020    Acute gastric ulcer due to Helicobacter pylori     Anemia     Chronic fatigue 07/10/2019    GI (gastrointestinal bleed)     Herpes zoster without complication 04/13/2020    Hiatal hernia     Hypogammaglobulinemia (HCC)     Hypokalemia 04/13/2020    Immunodeficiency (HCC) 07/10/2019    Iron deficiency     Knee arthropathy 07/10/2019    Leukopenia     Migraine without aura and without status migrainosus, not intractable 07/10/2019    Neutropenia associated with acquired immune deficiency syndrome (AIDS) (HCC)      Past Surgical History:        Procedure Laterality Date    CHOLECYSTECTOMY, OPEN      HIP SURGERY Right     KNEE SURGERY Right     SKIN GRAFT      TONSILLECTOMY      TUNNELED CENTRAL VENOUS CATHETER W/ SUBCUTANEOUS PORT      several times     Current Medications:   Prior to Admission medications    Medication Sig Start Date End Date Taking? Authorizing Provider   apixaban (ELIQUIS) 5 MG TABS tablet Take by mouth 2 times daily   Yes Provider, MD Denny   Cholecalciferol (VITAMIN D3) 125 MCG

## 2024-08-26 ENCOUNTER — TELEPHONE (OUTPATIENT)
Dept: VASCULAR SURGERY | Age: 59
End: 2024-08-26

## 2024-08-26 NOTE — TELEPHONE ENCOUNTER
Pt phoned to notify you that her port is also used for blood draws and Fe+ infusions.  According to the pt, Infusion Services says the Fe+ would be too caustic for a Martin.